# Patient Record
Sex: FEMALE | Race: OTHER
[De-identification: names, ages, dates, MRNs, and addresses within clinical notes are randomized per-mention and may not be internally consistent; named-entity substitution may affect disease eponyms.]

---

## 2017-11-17 ENCOUNTER — HOSPITAL ENCOUNTER (INPATIENT)
Dept: HOSPITAL 89 - ER | Age: 22
LOS: 8 days | Discharge: HOME | DRG: 872 | End: 2017-11-25
Attending: INTERNAL MEDICINE | Admitting: INTERNAL MEDICINE
Payer: MEDICAID

## 2017-11-17 VITALS — DIASTOLIC BLOOD PRESSURE: 46 MMHG | SYSTOLIC BLOOD PRESSURE: 91 MMHG

## 2017-11-17 VITALS
HEIGHT: 62 IN | WEIGHT: 202.44 LBS | WEIGHT: 202.44 LBS | BODY MASS INDEX: 37.26 KG/M2 | BODY MASS INDEX: 37.26 KG/M2 | HEIGHT: 62 IN

## 2017-11-17 VITALS — SYSTOLIC BLOOD PRESSURE: 78 MMHG | DIASTOLIC BLOOD PRESSURE: 46 MMHG

## 2017-11-17 VITALS — SYSTOLIC BLOOD PRESSURE: 81 MMHG | DIASTOLIC BLOOD PRESSURE: 45 MMHG

## 2017-11-17 VITALS — DIASTOLIC BLOOD PRESSURE: 50 MMHG | SYSTOLIC BLOOD PRESSURE: 98 MMHG

## 2017-11-17 VITALS — SYSTOLIC BLOOD PRESSURE: 84 MMHG | DIASTOLIC BLOOD PRESSURE: 44 MMHG

## 2017-11-17 VITALS — DIASTOLIC BLOOD PRESSURE: 45 MMHG | SYSTOLIC BLOOD PRESSURE: 80 MMHG

## 2017-11-17 VITALS — SYSTOLIC BLOOD PRESSURE: 91 MMHG | DIASTOLIC BLOOD PRESSURE: 43 MMHG

## 2017-11-17 VITALS — DIASTOLIC BLOOD PRESSURE: 43 MMHG | SYSTOLIC BLOOD PRESSURE: 80 MMHG

## 2017-11-17 VITALS — DIASTOLIC BLOOD PRESSURE: 47 MMHG | SYSTOLIC BLOOD PRESSURE: 94 MMHG

## 2017-11-17 VITALS — SYSTOLIC BLOOD PRESSURE: 96 MMHG | DIASTOLIC BLOOD PRESSURE: 45 MMHG

## 2017-11-17 DIAGNOSIS — J45.20: ICD-10-CM

## 2017-11-17 DIAGNOSIS — N13.6: ICD-10-CM

## 2017-11-17 DIAGNOSIS — E87.6: ICD-10-CM

## 2017-11-17 DIAGNOSIS — K21.9: ICD-10-CM

## 2017-11-17 DIAGNOSIS — F41.8: ICD-10-CM

## 2017-11-17 DIAGNOSIS — Z99.81: ICD-10-CM

## 2017-11-17 DIAGNOSIS — A41.51: Primary | ICD-10-CM

## 2017-11-17 DIAGNOSIS — Z90.49: ICD-10-CM

## 2017-11-17 DIAGNOSIS — M26.609: ICD-10-CM

## 2017-11-17 LAB — PLATELET COUNT, AUTOMATED: 127 K/UL (ref 150–450)

## 2017-11-17 PROCEDURE — 84155 ASSAY OF PROTEIN SERUM: CPT

## 2017-11-17 PROCEDURE — 87088 URINE BACTERIA CULTURE: CPT

## 2017-11-17 PROCEDURE — 84075 ASSAY ALKALINE PHOSPHATASE: CPT

## 2017-11-17 PROCEDURE — 82435 ASSAY OF BLOOD CHLORIDE: CPT

## 2017-11-17 PROCEDURE — 83735 ASSAY OF MAGNESIUM: CPT

## 2017-11-17 PROCEDURE — 87077 CULTURE AEROBIC IDENTIFY: CPT

## 2017-11-17 PROCEDURE — 83605 ASSAY OF LACTIC ACID: CPT

## 2017-11-17 PROCEDURE — 81001 URINALYSIS AUTO W/SCOPE: CPT

## 2017-11-17 PROCEDURE — 82247 BILIRUBIN TOTAL: CPT

## 2017-11-17 PROCEDURE — 76000 FLUOROSCOPY <1 HR PHYS/QHP: CPT

## 2017-11-17 PROCEDURE — 87880 STREP A ASSAY W/OPTIC: CPT

## 2017-11-17 PROCEDURE — 74170 CT ABD WO CNTRST FLWD CNTRST: CPT

## 2017-11-17 PROCEDURE — 82374 ASSAY BLOOD CARBON DIOXIDE: CPT

## 2017-11-17 PROCEDURE — 72194 CT PELVIS W/O & W/DYE: CPT

## 2017-11-17 PROCEDURE — 96361 HYDRATE IV INFUSION ADD-ON: CPT

## 2017-11-17 PROCEDURE — 87081 CULTURE SCREEN ONLY: CPT

## 2017-11-17 PROCEDURE — 36415 COLL VENOUS BLD VENIPUNCTURE: CPT

## 2017-11-17 PROCEDURE — 87186 SC STD MICRODIL/AGAR DIL: CPT

## 2017-11-17 PROCEDURE — 84295 ASSAY OF SERUM SODIUM: CPT

## 2017-11-17 PROCEDURE — 82947 ASSAY GLUCOSE BLOOD QUANT: CPT

## 2017-11-17 PROCEDURE — 82310 ASSAY OF CALCIUM: CPT

## 2017-11-17 PROCEDURE — 87502 INFLUENZA DNA AMP PROBE: CPT

## 2017-11-17 PROCEDURE — 97161 PT EVAL LOW COMPLEX 20 MIN: CPT

## 2017-11-17 PROCEDURE — 82565 ASSAY OF CREATININE: CPT

## 2017-11-17 PROCEDURE — 96365 THER/PROPH/DIAG IV INF INIT: CPT

## 2017-11-17 PROCEDURE — 82040 ASSAY OF SERUM ALBUMIN: CPT

## 2017-11-17 PROCEDURE — 85025 COMPLETE CBC W/AUTO DIFF WBC: CPT

## 2017-11-17 PROCEDURE — 84132 ASSAY OF SERUM POTASSIUM: CPT

## 2017-11-17 PROCEDURE — 96375 TX/PRO/DX INJ NEW DRUG ADDON: CPT

## 2017-11-17 PROCEDURE — 84520 ASSAY OF UREA NITROGEN: CPT

## 2017-11-17 PROCEDURE — 99285 EMERGENCY DEPT VISIT HI MDM: CPT

## 2017-11-17 PROCEDURE — 86140 C-REACTIVE PROTEIN: CPT

## 2017-11-17 PROCEDURE — 71010: CPT

## 2017-11-17 PROCEDURE — 84460 ALANINE AMINO (ALT) (SGPT): CPT

## 2017-11-17 PROCEDURE — 84450 TRANSFERASE (AST) (SGOT): CPT

## 2017-11-17 PROCEDURE — 85651 RBC SED RATE NONAUTOMATED: CPT

## 2017-11-17 PROCEDURE — 86308 HETEROPHILE ANTIBODY SCREEN: CPT

## 2017-11-17 PROCEDURE — 74177 CT ABD & PELVIS W/CONTRAST: CPT

## 2017-11-17 PROCEDURE — 96367 TX/PROPH/DG ADDL SEQ IV INF: CPT

## 2017-11-17 PROCEDURE — 93005 ELECTROCARDIOGRAM TRACING: CPT

## 2017-11-17 PROCEDURE — 87040 BLOOD CULTURE FOR BACTERIA: CPT

## 2017-11-17 RX ADMIN — THIAMINE HYDROCHLORIDE PRN MLS/HR: 100 INJECTION, SOLUTION INTRAMUSCULAR; INTRAVENOUS at 21:53

## 2017-11-17 NOTE — RADIOLOGY IMAGING REPORT
FACILITY: [facility] 

 

PATIENT NAME: Lissette Lindsey

: 1995

MR: [id number]

V: [id number]

EXAM DATE: 

ORDERING PHYSICIAN: DENISHA NG

TECHNOLOGIST: 

 

Location: [facility] [place]

Patient: Lissette Lindsey

: 1995

MRN: [id number]

Visit/Account:[id number]

Date of Sevice: 2017

 

ACCESSION #: [id number]

 

EXAMINATION: Portable AP Chest

 

HISTORY: Fever.

 

COMPARISON: 2016.

 

FINDINGS:

The lungs are clear.  No focal consolidation or significant pleural fluid.  No pneumothorax.  Normal 
cardiomediastinal silhouette, with normal heart size and pulmonary vascularity.  Visualized osseous s
tructures are unremarkable.

 

IMPRESSION:  Negative chest.

 

Report Dictated By: Adrian Wiseman MD at 2017 7:47 PM

 

Report E-Signed By: Adrian Wiseman MD  at 2017 7:48 PM

 

WSN:[id number]

## 2017-11-17 NOTE — EKG
FACILITY: [facility] 

 

PATIENT NAME: NINI SON

: [id number]

MR: [id number]

V: [id number]

EXAM DATE: 

ORDERING PHYSICIAN: DENISHA NG

TECHNOLOGIST: SIMONS

 

Test Reason : TACHY

Blood Pressure : ***/*** mmHG

Vent. Rate : 164 BPM     Atrial Rate : 164 BPM

   P-R Int : 104 ms          QRS Dur : 066 ms

    QT Int : 300 ms       P-R-T Axes : 000 034 053 degrees

   QTc Int : 495 ms

 

Sinus tachycardia with short SC

Cannot rule out Anterior infarct , age undetermined

Abnormal ECG

No previous ECGs available

Confirmed by ANTHONY MAJOR ([id number]) on 2017 6:13:09 AM

 

Referred By:  JAMA           Confirmed By:ANTHONY MAJOR

## 2017-11-17 NOTE — ER REPORT
History and Physical


Time Seen By MD:  18:13


Hx. of Stated Complaint:  


Patient seen earlier with headache.  Now reporting right sided abdominal and 


back pain.


HPI/ROS


CHIEF COMPLAINT: Fever, right-sided abdominal pain





HISTORY OF PRESENT ILLNESS: 22-year-old female patient presents to emergency 

room with complaint of fever, right-sided abdominal pain. Patient states this 

been going on just since she woke up from nap this afternoon. Patient states she

's been ill for the last 3 days. She states she was seen earlier in the 

emergency room today. She had a horrendous headache at that time. She states 

the headache is completely resolved. She's not having any headache or neck 

stiffness at this time. She states though that she's having lots of right-sided 

abdominal pain. She states she is not had any nausea or vomiting today, however 

she has had over the last couple days. She states she was diagnosed with a 

urinary tract infection and was started on antibiotics. She has not started 

taking those yet. She states she is significantly dizzy. She states that she 

has a pain that she rates an 8 out of 10.





REVIEW OF SYSTEMS:


Respiratory: No cough, no dyspnea.


Cardiovascular: No chest pain, no palpitations.


Gastrointestinal: As noted above


Musculoskeletal: No back pain.


Allergies:  


Coded Allergies:  


     No Known Allergies (Verified  Allergy, Mild, 16)


Home Meds


Active Scripts


Promethazine Hcl (PROMETHAZINE HCL) 25 Mg Tablet, 25 MG PO Q8H Y for NAUSEA/

VOMITING, #20 TAB 0 Refills


   Prov:ELISE YUN MD         17


Cyclobenzaprine Hcl (CYCLOBENZAPRINE HCL) 10 Mg Tablet, 10 MG PO Q8H Y for 

MUSCLE SPASMS, #20 TAB 0 Refills


   Prov:ELISE YUN MD         17


Ketorolac Tromethamine (KETOROLAC TROMETHAMINE) 10 Mg Tab, 10 MG PO Q6H Y for 

PAIN, #12 TAB 0 Refills


   Prov:ELISE YUN MD         17


Amoxicillin/Pot Clav 875-125 Mg Tab (AUGMENTIN 875-125 TABLET) 1 Each Tablet, 1 

TAB PO Q12H, #20 TAB 0 Refills


   Prov:ELISE YUN MD         17


Reported Medications


[implanon]   No Conflict Check, SUBQ


   3/5/16


Discontinued Reported Medications


Alprazolam (XANAX) 1 Mg Tablet, 1 TAB PO TID Y for ANXIETY, TAB


   3/5/16


Discontinued Scripts


Ibuprofen (IBUPROFEN) 600 Mg Tablet, 1 TAB PO Q6H for PAIN, #30


   Prov:KIMMY MOORE MD         16


Past Medical/Surgical History


Patient has a past medical history of migraines, oxygen at night, asthma, reflux

, cholecystitis, TMJ, depression, anxiety.


Patient has a surgical history of tonsillectomy, cholecystectomy.


Patient has a family medical history of cancer, CAD, diabetes, psychiatric 

problems.


Reviewed Nurses Notes:  Yes


Hx Smoking:  No


Smoking Status:  Never Smoker


Exposure to Second Hand Smoke?:  Yes


Hx Substance Use Disorder:  No


Hx Alcohol Use:  No


Constitutional





Vital Sign - Last 24 Hours








 17





 17:59 17:59 18:03 18:08


 


Temp  103.2  


 


Pulse  168 168 165


 


Resp  18  35


 


B/P (MAP) 130/80 (97) 130/80  


 


Pulse Ox  92 92 92


 


O2 Delivery  Room Air  


 


    





 17





 18:18 18:23 18:30 18:33


 


Pulse 166 171  169


 


Resp 13 12  22


 


B/P (MAP)   112/49 (70) 


 


Pulse Ox 91 95  90





 17





 18:38 18:43 18:48 18:53


 


Pulse 166 156 151 148


 


Resp 32 34 16 11


 


Pulse Ox 91 90 93 93





 17





 18:58 19:00 19:02 19:03


 


Temp   103.2 


 


Pulse 149  168 142


 


Resp 18  18 32


 


B/P (MAP)  95/55 (68) 130/80 (97) 


 


Pulse Ox 91  92 91


 


O2 Delivery   Room Air 


 


    





 17





 19:08 19:28 19:30 19:30


 


Temp   102.5 


 


Pulse  144  


 


Resp 35 18  


 


B/P (MAP)    102/51 (68)


 


Pulse Ox 91 91  


 


    





 17





 19:43 19:58 20:00 20:13


 


Pulse 133 133  133


 


Resp 10 19  


 


B/P (MAP)   101/52 (68) 


 


Pulse Ox 92 88  95














Intake and Output   


 


 17





 15:00 23:00 07:00


 


Intake Total  2100 ml 


 


Balance  2100 ml 








Physical Exam


  General Appearance: The patient is alert, has no immediate need for airway 

protection and no current signs of toxicity.


ENT: Tympanic membranes are erythematous, bulging, auditory canals are patent, 

mucous membranes are moist.


Respiratory: Chest is non tender, lungs are clear to auscultation.


Cardiac: regular rhythm. Patient is tachycardic with a ventricular rate was 164 

bpm


Gastrointestinal: Abdomen is soft and tender in the right lower quadrant, no 

masses, bowel sounds normal. Patient had a right-sided CVA tenderness.


Musculoskeletal:  Neck: Neck is supple and non tender.


   Extremities have full range of motion and are non tender.


Skin: No rashes or lesions.





DIFFERENTIAL DIAGNOSIS: After history and physical exam differential diagnosis 

was considered for fever in adults including but not limited to pneumonia, 

urinary tract infection, viral syndrome, and influenza. Included in the 

differential is pyelonephritis, appendicitis





Medical Decision Making


Data Points


Result Diagram:  


17 1815                                                                  

              17 1815





Laboratory





Hematology








Test


  17


18:15 17


18:29


 


Red Blood Count


  4.96 M/uL


(4.17-5.56) 


 


 


Mean Corpuscular Volume


  87.5 fL


(80.0-96.0) 


 


 


Mean Corpuscular Hemoglobin


  30.9 pg


(26.0-33.0) 


 


 


Mean Corpuscular Hemoglobin


Concent 35.3 g/dL


(32.0-36.0) 


 


 


Red Cell Distribution Width


  12.7 %


(11.5-14.5) 


 


 


Mean Platelet Volume


  10.3 fL


(7.2-11.1) 


 


 


Neutrophils (%) (Auto)


  78.0 %


(39.4-72.5) 


 


 


Lymphocytes (%) (Auto)


  20.6 %


(17.6-49.6) 


 


 


Monocytes (%) (Auto)


  0.7 %


(4.1-12.4) 


 


 


Eosinophils (%) (Auto)


  0.2 %


(0.4-6.7) 


 


 


Basophils (%) (Auto)


  0.5 %


(0.3-1.4) 


 


 


Nucleated RBC Relative Count


(auto) 0.1 /100WBC 


  


 


 


Neutrophils # (Auto)


  3.0 K/uL


(2.0-7.4) 


 


 


Lymphocytes # (Auto)


  0.8 K/uL


(1.3-3.6) 


 


 


Monocytes # (Auto)


  0.0 K/uL


(0.3-1.0) 


 


 


Eosinophils # (Auto)


  0.0 K/uL


(0.0-0.5) 


 


 


Basophils # (Auto)


  0.0 K/uL


(0.0-0.1) 


 


 


Nucleated RBC Absolute Count


(auto) 0.00 K/uL 


  


 


 


Erythrocyte Sedimentation Rate


  43 mm/HOUR


(0-20) 


 


 


Sodium Level


  135 mmol/L


(137-145) 


 


 


Potassium Level


  3.7 mmol/L


(3.5-5.0) 


 


 


Chloride Level


  100 mmol/L


() 


 


 


Carbon Dioxide Level


  19 mmol/L


(22-31) 


 


 


Blood Urea Nitrogen


  13 mg/dl


(7-18) 


 


 


Creatinine


  1.00 mg/dl


(0.52-1.04) 


 


 


Glomerular Filtration Rate


Calc > 60.0 


  


 


 


Random Glucose


  91 mg/dl


() 


 


 


Lactate


  4.8 mmol/L


(0.7-2.1) 


 


 


Calcium Level


  9.1 mg/dl


(8.4-10.2) 


 


 


Total Bilirubin


  2.3 mg/dl


(0.2-1.3) 


 


 


Aspartate Amino Transf


(AST/SGOT) 28 U/L (0-35) 


  


 


 


Alanine Aminotransferase


(ALT/SGPT) 57 U/L (0-56) 


  


 


 


Alkaline Phosphatase


  142 U/L


(0-126) 


 


 


C-Reactive Protein


  21.9 mg/dl


(<1.0) 


 


 


Total Protein


  7.1 gm/dl


(6.3-8.2) 


 


 


Albumin


  3.9 g/dl


(3.5-5.0) 


 


 


Monoscreen


  Negative


(NEGATIVE) 


 


 


Influenza Type A Antigen


  Negative


(NEGATIVE) 


 


 


Influenza Type B Antigen


  Negative


(NEGATIVE) 


 


 


Group A Streptococcus Screen


  Negative


(NEGATIVE) 


 


 


Urine Color  Caitlin 


 


Urine Clarity  Turbid 


 


Urine pH


  


  5.0 pH


(4.8-9.5)


 


Urine Specific Gravity  1.016 


 


Urine Protein


  


  100 mg/dL


(NEGATIVE)


 


Urine Glucose (UA)


  


  Negative mg/dL


(NEGATIVE)


 


Urine Ketones


  


  Negative mg/dL


(NEGATIVE)


 


Urine Blood


  


  Large


(NEGATIVE)


 


Urine Nitrite


  


  Negative


(NEGATIVE)


 


Urine Bilirubin


  


  Negative


(NEGATIVE)


 


Urine Urobilinogen


  


  4.0 mg/dL


(0.2-1.9)


 


Urine Leukocyte Esterase


  


  Large


(NEGATIVE)


 


Urine RBC


  


  42 /HPF


(0-2/HPF)


 


Urine WBC


  


  1016 /HPF


(0-5/HPF)


 


Urine WBC Clumps  Many /HPF 


 


Urine Squamous Epithelial


Cells 


  Many /LPF


(</=FEW)


 


Urine Transitional Epithelial


Cells 


  Many /LPF


(NONE-FEW)


 


Urine Bacteria


  


  Moderate /HPF


(NONE-FEW)


 


Urine Granular Casts


  


  Many /LPF


(NONE)


 


Urine Mucus


  


  Few /HPF


(NONE-FEW)








Chemistry








Test


  17


18:15 17


18:29


 


White Blood Count


  3.8 k/uL


(4.5-11.0) 


 


 


Red Blood Count


  4.96 M/uL


(4.17-5.56) 


 


 


Hemoglobin


  15.3 g/dL


(12.0-16.0) 


 


 


Hematocrit


  43.4 %


(34.0-47.0) 


 


 


Mean Corpuscular Volume


  87.5 fL


(80.0-96.0) 


 


 


Mean Corpuscular Hemoglobin


  30.9 pg


(26.0-33.0) 


 


 


Mean Corpuscular Hemoglobin


Concent 35.3 g/dL


(32.0-36.0) 


 


 


Red Cell Distribution Width


  12.7 %


(11.5-14.5) 


 


 


Platelet Count


  127 K/uL


(150-450) 


 


 


Mean Platelet Volume


  10.3 fL


(7.2-11.1) 


 


 


Neutrophils (%) (Auto)


  78.0 %


(39.4-72.5) 


 


 


Lymphocytes (%) (Auto)


  20.6 %


(17.6-49.6) 


 


 


Monocytes (%) (Auto)


  0.7 %


(4.1-12.4) 


 


 


Eosinophils (%) (Auto)


  0.2 %


(0.4-6.7) 


 


 


Basophils (%) (Auto)


  0.5 %


(0.3-1.4) 


 


 


Nucleated RBC Relative Count


(auto) 0.1 /100WBC 


  


 


 


Neutrophils # (Auto)


  3.0 K/uL


(2.0-7.4) 


 


 


Lymphocytes # (Auto)


  0.8 K/uL


(1.3-3.6) 


 


 


Monocytes # (Auto)


  0.0 K/uL


(0.3-1.0) 


 


 


Eosinophils # (Auto)


  0.0 K/uL


(0.0-0.5) 


 


 


Basophils # (Auto)


  0.0 K/uL


(0.0-0.1) 


 


 


Nucleated RBC Absolute Count


(auto) 0.00 K/uL 


  


 


 


Erythrocyte Sedimentation Rate


  43 mm/HOUR


(0-20) 


 


 


Glomerular Filtration Rate


Calc > 60.0 


  


 


 


Lactate


  4.8 mmol/L


(0.7-2.1) 


 


 


Calcium Level


  9.1 mg/dl


(8.4-10.2) 


 


 


Total Bilirubin


  2.3 mg/dl


(0.2-1.3) 


 


 


Aspartate Amino Transf


(AST/SGOT) 28 U/L (0-35) 


  


 


 


Alanine Aminotransferase


(ALT/SGPT) 57 U/L (0-56) 


  


 


 


Alkaline Phosphatase


  142 U/L


(0-126) 


 


 


C-Reactive Protein


  21.9 mg/dl


(<1.0) 


 


 


Total Protein


  7.1 gm/dl


(6.3-8.2) 


 


 


Albumin


  3.9 g/dl


(3.5-5.0) 


 


 


Monoscreen


  Negative


(NEGATIVE) 


 


 


Influenza Type A Antigen


  Negative


(NEGATIVE) 


 


 


Influenza Type B Antigen


  Negative


(NEGATIVE) 


 


 


Group A Streptococcus Screen


  Negative


(NEGATIVE) 


 


 


Urine Color  Caitlin 


 


Urine Clarity  Turbid 


 


Urine pH


  


  5.0 pH


(4.8-9.5)


 


Urine Specific Gravity  1.016 


 


Urine Protein


  


  100 mg/dL


(NEGATIVE)


 


Urine Glucose (UA)


  


  Negative mg/dL


(NEGATIVE)


 


Urine Ketones


  


  Negative mg/dL


(NEGATIVE)


 


Urine Blood


  


  Large


(NEGATIVE)


 


Urine Nitrite


  


  Negative


(NEGATIVE)


 


Urine Bilirubin


  


  Negative


(NEGATIVE)


 


Urine Urobilinogen


  


  4.0 mg/dL


(0.2-1.9)


 


Urine Leukocyte Esterase


  


  Large


(NEGATIVE)


 


Urine RBC


  


  42 /HPF


(0-2/HPF)


 


Urine WBC


  


  1016 /HPF


(0-5/HPF)


 


Urine WBC Clumps  Many /HPF 


 


Urine Squamous Epithelial


Cells 


  Many /LPF


(</=FEW)


 


Urine Transitional Epithelial


Cells 


  Many /LPF


(NONE-FEW)


 


Urine Bacteria


  


  Moderate /HPF


(NONE-FEW)


 


Urine Granular Casts


  


  Many /LPF


(NONE)


 


Urine Mucus


  


  Few /HPF


(NONE-FEW)








Urinalysis








Test


  17


18:29


 


Urine Color Caitlin 


 


Urine Clarity Turbid 


 


Urine pH


  5.0 pH


(4.8-9.5)


 


Urine Specific Gravity 1.016 


 


Urine Protein


  100 mg/dL


(NEGATIVE)


 


Urine Glucose (UA)


  Negative mg/dL


(NEGATIVE)


 


Urine Ketones


  Negative mg/dL


(NEGATIVE)


 


Urine Blood


  Large


(NEGATIVE)


 


Urine Nitrite


  Negative


(NEGATIVE)


 


Urine Bilirubin


  Negative


(NEGATIVE)


 


Urine Urobilinogen


  4.0 mg/dL


(0.2-1.9)


 


Urine Leukocyte Esterase


  Large


(NEGATIVE)


 


Urine RBC


  42 /HPF


(0-2/HPF)


 


Urine WBC


  1016 /HPF


(0-5/HPF)


 


Urine WBC Clumps Many /HPF 


 


Urine Squamous Epithelial


Cells Many /LPF


(</=FEW)


 


Urine Transitional Epithelial


Cells Many /LPF


(NONE-FEW)


 


Urine Bacteria


  Moderate /HPF


(NONE-FEW)


 


Urine Granular Casts


  Many /LPF


(NONE)


 


Urine Mucus


  Few /HPF


(NONE-FEW)











EKG/Imaging


EKG Interpretation


12 lead EKG:


      Rhythm: Sinus tachycardia with ventricular rate of 164 bpm


      Axis: normal 


      QRS: normal


      ST segments: Nonspecific ST abnormality


Imaging


FACILITY: [facility] 


 


PATIENT NAME: Lissette Lindsey


: 1995


MR: [id number]


V: [id number]


EXAM DATE: [id number]


ORDERING PHYSICIAN: DENISHA NG


TECHNOLOGIST: 


 


Location: [facility]


Patient: Lissette Lindsey


: 1995


MRN: [id number]


Visit/Account:[id number]


Date of Sevice: 2017


 


ACCESSION #: [id number]


 


CT abdomen and pelvis with IV contrast


 


Indication: Abdominal pain, right lower quadrant


 


Comparison:   Prior examination from 2016 is not available at this 

time..


 


Technique:   Axial CT images were obtained through the abdomen and pelvis 

during injection of nonionic iodinated intravenous contrast. Reformatted 

coronal and sagittal images were also obtained. One of the following dose 

optimization techniques was utilized in the performance of this exam: Automated 

exposure control; adjustment of the mA and/or kV according to the patient's size

; or use of an iterative  reconstruction technique.  Specific details can be 

referenced in the facility's radiology CT exam operational policy.


Contrast:   75 ml of Isovue-370  IV contrast.


 


Findings:


Lower lung fields: Minimal hypoventilatory changes are seen in the bases.


 


Liver: No focal parenchymal abnormality of the liver.


Biliary: Gallbladder has been removed. Intra and extra hepatic biliary system 

is within normal limits.


Pancreas: Normal appearance.


Spleen: Normal appearance.


Adrenal glands: Unremarkable.


Kidneys / retroperitoneum: Left kidney is unremarkable. There is a heterogeneous

, striated appearance of the right kidney with moderate asymmetric perinephric 

stranding along the right perirenal space. There is urothelial enhancement seen 

involving the right ureter. There is a linear, 4 mm stone is seen at the right 

UPJ. Significant right-sided hydronephrosis.


 


 


Bowel / peritoneum / mesenteries: Bowel is without acute pathology. Appendix 

appears normal.


 


 


Lymph node assessment: No pathologic adenopathy identified. No acute pathology 

within the pelvis.


 


 


Vessels: No significant atherosclerotic calcifications seen throughout a 

nonaneurysmal abdominal aorta and branches.


 


Musculoskeletal / Body wall: Containing umbilical hernia.


 


 


 


IMPRESSION:


1. There is a striated appearance of the right kidney with moderate perinephric 

stranding. That in combination with the urothelial enhancement of proximal 

ureter is highly suspicious for right-sided pyelonephritis. No focal intrarenal 

abscess.


2. There is a 4 mm stone at the right ureteropelvic junction without 

significant hydronephrosis.


 


 


Report Dictated By: Julian Garcia MD at 2017 7:47 PM


 


Report E-Signed By: Julian Garcia MD  at 2017 7:56 PM





FACILITY: [facility] 


 


PATIENT NAME: Lissette Lindsey


: 1995


MR: [id number]


V: [id number]


EXAM DATE: [id number]


ORDERING PHYSICIAN: DENISHA NG


TECHNOLOGIST: 


 


Location: [facility]


Patient: Lissette Lindsey


: 1995


MRN: [id number]


Visit/Account:[id number]


Date of Sevice: 2017


 


ACCESSION #: [id number]


 


EXAMINATION: Portable AP Chest


 


HISTORY: Fever.


 


COMPARISON: 2016.


 


FINDINGS:


The lungs are clear.  No focal consolidation or significant pleural fluid.  No 

pneumothorax.  Normal cardiomediastinal silhouette, with normal heart size and 

pulmonary vascularity.  Visualized osseous structures are unremarkable.


 


IMPRESSION:  Negative chest.


 


Report Dictated By: Adrian Wiseman MD at 2017 7:47 PM


 


Report E-Signed By: Adrian Wiseman MD  at 2017 7:48 PM





ED Course/Re-evaluation


ED Course


Patient was admitted and examined, history and physical were obtained. 

Differential diagnoses were considered. On examination patient has significant 

tenderness to the right lower quadrant and right upper quadrant. A CBC, CMP, 

urinalysis, lactate, chest x-ray, CT scan of abdomen and pelvis were done. 

Patient had a white count of 3.8. Earlier today she had a white count 26. 

Lactate was elevated at 4.8, ESR was 42. Chest x-ray was negative, urinalysis 

showed 1000 white cells per high-power field in the urine. CT scan showed a 

right pyelonephritis. I discussed the case with Dr. Jen Hannah. Patient 

received 100 mg of Solu-Cortef here in the emergency room. Patient also 

received a dose of Zosyn, 4.5. Patient was admitted to Dr. Jen Hannah with a 

diagnosis of pyelonephritis and sepsis. Patient was admitted to the intensive 

care unit. I discussed this with the patient and her family and they verbalized 

understanding and agreement.


Decision to Disposition Date:  2017


Decision to Disposition Time:  20:38





Depart


Departure


Latest Vital Signs





Vital Signs








  Date Time  Temp Pulse Resp B/P (MAP) Pulse Ox O2 Delivery O2 Flow Rate FiO2


 


17 20:13  133   95   


 


17 20:00    101/52 (68)    


 


17 19:58   19     


 


17 19:30 102.5       


 


17 19:02      Room Air  








Impression:  


 Primary Impression:  


 Pyelonephritis


 Additional Impression:  


 Sepsis


Condition:  Improved


Disposition:  Admitted from ER


Referrals:  


RODERICK PABON (PCP)





Problem Qualifiers








 Additional Impression:  


 Sepsis


 Sepsis type:  sepsis due to unspecified organism  Qualified Codes:  A41.9 - 

Sepsis, unspecified organism








DENISHA NG 2017 18:13

## 2017-11-17 NOTE — RADIOLOGY IMAGING REPORT
FACILITY: [facility] 

 

PATIENT NAME: Lissette Lindsey

: 1995

MR: [id number]

V: [id number]

EXAM DATE: 

ORDERING PHYSICIAN: DENISHA NG

TECHNOLOGIST: 

 

Location: [facility]

Patient: Lissette Lindsey

: 1995

MRN: [id number]

Visit/Account:[id number]

Date of Sevice: 2017

 

ACCESSION #: [id number]

 

CT abdomen and pelvis with IV contrast

 

Indication: Abdominal pain, right lower quadrant

 

Comparison:   Prior examination from 2016 is not available at this time..

 

Technique:   Axial CT images were obtained through the abdomen and pelvis during injection of nonioni
c iodinated intravenous contrast. Reformatted coronal and sagittal images were also obtained. One of 
the following dose optimization techniques was utilized in the performance of this exam: Automated ex
posure control; adjustment of the mA and/or kV according to the patient's size; or use of an iterativ
e  reconstruction technique.  Specific details can be referenced in the facility's radiology CT exam 
operational policy.

Contrast:   75 ml of Isovue-370  IV contrast.

 

Findings:

Lower lung fields: Minimal hypoventilatory changes are seen in the bases.

 

Liver: No focal parenchymal abnormality of the liver.

Biliary: Gallbladder has been removed. Intra and extra hepatic biliary system is within normal limits
.

Pancreas: Normal appearance.

Spleen: Normal appearance.

Adrenal glands: Unremarkable.

Kidneys / retroperitoneum: Left kidney is unremarkable. There is a heterogeneous, striated appearance
 of the right kidney with moderate asymmetric perinephric stranding along the right perirenal space. 
There is urothelial enhancement seen involving the right ureter. There is a linear, 4 mm stone is see
n at the right UPJ. Significant right-sided hydronephrosis.

 

 

Bowel / peritoneum / mesenteries: Bowel is without acute pathology. Appendix appears normal.

 

 

Lymph node assessment: No pathologic adenopathy identified. No acute pathology within the pelvis.

 

 

Vessels: No significant atherosclerotic calcifications seen throughout a nonaneurysmal abdominal aort
a and branches.

 

Musculoskeletal / Body wall: Containing umbilical hernia.

 

 

 

IMPRESSION:

1. There is a striated appearance of the right kidney with moderate perinephric stranding. That in co
mbination with the urothelial enhancement of proximal ureter is highly suspicious for right-sided hira
lonephritis. No focal intrarenal abscess.

2. There is a 4 mm stone at the right ureteropelvic junction without significant hydronephrosis.

 

 

Report Dictated By: Julian Garcia MD at 2017 7:47 PM

 

Report E-Signed By: Julian Garcia MD  at 2017 7:56 PM

 

WSN:[id number]

## 2017-11-17 NOTE — HISTORY & PHYSICAL
History of Present Illness


Chief Complaint


Abdominal pain.


History of Present Illness


The patient is a 22 year old female with PMH significant for asthma and 

migraine headaches who presents with abdominal pain. The patient states she has 

been ill for about 3 days. She has had intermittent abdominal pain and has felt 

feverish. She has also had some nausea. She has not checked her temperature. 

She denies burning with urination. She was seen in the ER this am for severe 

headache and was also found to have a UTI at that time. She was given an RX for 

an antibiotic, but had not gotten it filled yet. She was treated for a migraine 

HA in the ER and went home and napped. When she woke up from her nap she felt 

dizzy and her abdominal pain was 8/10. She returned to the ER for evaluation.





In the ER, the patient was found to be tachycardic with heart rate in the 160s. 

Her lactate was elevated at 4.8. UA showed significant pyuria. CT scan showed a 

right pyelonephritis as well as a 4mm nonobstructing kidney stone at the right 

ureteropelvic junction She was started on IV fluids per the sepsis protocol and 

given a dose of Zosyn 4.5g IV. Solucortef 100mg was given. Blood cultures were 

drawn. Urine culture was done.





History


Problems:  


(1) Hx of cholecystectomy


Status:  Resolved


(2) History of tonsillectomy


Status:  Resolved


(3) Anxiety, generalized


Status:  Chronic


(4) Migraine headache


Status:  Chronic


(5) Asthma


Status:  Chronic


(6) Depression


Status:  Chronic


Home Meds


Reported Medications


Albuterol Sulfate 90 Mcg/Act (PROAIR HFA 90 MCG/ACT) 8.5 Gm Hfa.aer.ad, 2 PUFF 

IH Q4-6H, INHALER


   17


[implanon]   No Conflict Check, SUBQ


   3/5/16


Discontinued Reported Medications


Alprazolam (XANAX) 1 Mg Tablet, 1 TAB PO TID Y for ANXIETY, TAB


   3/5/16


Discontinued Scripts


Promethazine Hcl (PROMETHAZINE HCL) 25 Mg Tablet, 25 MG PO Q8H Y for NAUSEA/

VOMITING, #20 TAB 0 Refills


   Prov:ELISE YUN MD         17


Cyclobenzaprine Hcl (CYCLOBENZAPRINE HCL) 10 Mg Tablet, 10 MG PO Q8H Y for 

MUSCLE SPASMS, #20 TAB 0 Refills


   Prov:ELISE YUN MD         17


Ketorolac Tromethamine (KETOROLAC TROMETHAMINE) 10 Mg Tab, 10 MG PO Q6H Y for 

PAIN, #12 TAB 0 Refills


   Prov:ELISE YUN MD         17


Amoxicillin/Pot Clav 875-125 Mg Tab (AUGMENTIN 875-125 TABLET) 1 Each Tablet, 1 

TAB PO Q12H, #20 TAB 0 Refills


   Prov:ELISE YUN MD         17


Ibuprofen (IBUPROFEN) 600 Mg Tablet, 1 TAB PO Q6H for PAIN, #30


   Prov:KIMMY MOORE MD         16


Allergies:  


Coded Allergies:  


     No Known Allergies (Verified  Allergy, Mild, 16)


Patient History:  


FH: CHF (congestive heart failure)


  Grandmother


FH: diabetes mellitus


  Grandmother


FH: hypertension


  Grandmother


FH: migraine headache


  MOTHER


Psychiatric hospitalization


  Grandmother


Other Social/Family Hx


The patient is single.


Hx Smoking:  No


Smoking Status:  Never Smoker


Exposure to Second Hand Smoke?:  Yes


Caffeine Intake:  Soda


Caffeine/Cups Per Day:  2-3/DAY


Hx Alcohol Use:  No


Hx Substance Use Disorder:  No


Social Drug Use:  Never


Social Drugs:  Marijuana





Review of Systems


All Systems Reviewed/Normal:  Yes, Except as Noted


Constitutional:  Fever


Neurological:  Other (Migraines.)


Eyes:  No Vision Change


ENT:  No Hearing Loss


Cardiovascular:  No Chest Pain


Respiratory:  No Shortness of Breath


Gastrointestinal:  Nausea, Abdominal Pain


Genitourinary:  Other (Flank pain.)


Psychiatric:  Depression, Anxiety (Hx of depression with suicidal ideation and 

anxiety.)





Exam


Vital Signs





Vital Signs








  Date Time  Temp Pulse Resp B/P (MAP) Pulse Ox O2 Delivery O2 Flow Rate FiO2


 


17 22:32     95 Nasal Cannula 2.0 


 


17 22:30 98.5 125 21 91/46 (61)    








General Appearance:  Other (Sleepy after pain meds given.)


Neuro:  No Gross deficits


Eyes:  PERRLA


Cardiovascular:  Other (Tachy, regular.)


Respiratory:  Clear to Auscultation


GI:  Other (Soft, tender on right side.)


Extremities:  Warm, Perfused


Integumentary:  Skin Intact without Lesion / Mass


Psych:  Appropriate Mood & Affect





Medical Decision Making


Data Points


Result Diagram:  


17














Item Value  Date Time


 


Total Bilirubin 2.3 mg/dl H 17 1815


 


Aspartate Amino Transf (AST/SGOT) 28 U/L 17 1815


 


Alanine Aminotransferase (ALT/SGPT) 57 U/L H 17 1815


 


Alkaline Phosphatase 142 U/L H 17 1815


 


C-Reactive Protein 21.9 mg/dl H 17 1815


 


Total Protein 7.1 gm/dl 17 1815


 


Albumin 3.9 g/dl 17 181


 


Calcium Level 9.1 mg/dl 17 1815


 


Lactate 4.8 mmol/L *H 17 181


 


Lactate 1.9 mmol/L 17 2153


 


Monoscreen Negative 17 181


 


Influenza Type A Antigen Negative 17


 


Influenza Type B Antigen Negative 17 181


 


Group A Streptococcus Screen Negative 17 181


 


Urine Color Caitlin 17 1829


 


Urine Clarity Turbid 17 1829


 


Urine pH 5.0 pH 17 1829


 


Urine Specific Gravity 1.016 17 1829


 


Urine Protein 100 mg/dL 17 1829


 


Urine Glucose (UA) Negative mg/dL 17 1829


 


Urine Ketones Negative mg/dL 17 1829


 


Urine Blood Large 17 1829


 


Urine Nitrite Negative 17 1829


 


Urine Bilirubin Negative 17 1829


 


Urine Urobilinogen 4.0 mg/dL H 17 1829


 


Urine Leukocyte Esterase Large H 17 1829


 


Urine RBC 42 /HPF 17 1829


 


Urine WBC 1016 /HPF 17 1829


 


Urine WBC Clumps Many /HPF 17 1829


 


Urine Squamous Epithelial Cells Many /LPF H 17 1829


 


Urine Transitional Epithelial Cells Many /LPF H 17 1829


 


Urine Bacteria Moderate /HPF H 17 1829


 


Urine Granular Casts Many /LPF H 17 1829


 


Urine Mucus Few /HPF 17 1829


 


Urine HCG, Qualitative Negative 17 0818











                               [facility] *LIVE*                     

             


                              [address] [place] [address]                 

     


                    TITO CASTRO M.D., DIRECTOR OF LABORATORY SERVICES      

     


                              HAZEL MAYNARD M.D., PATHOLOGIST





RUN DATE: 17                 Specimen Inquiry Report                     

    PAGE 1   


RUN TIME: 2300





--------------------------------------------------------------------------------

------------





PATIENT: LISSETTE LINDSEY                ACCT: [id number] LOC:  [facility]           U

: [id number]


                                       AGE/SX: 22/         ROOM:            REG

: 17


REG DR:  ELISE YUN MD            :    1995   BED:             DIS

:         


                                       STATUS: MANUEL VAUGHAN       TLOC:           


--------------------------------------------------------------------------------

------------








SPEC #: [id number]       ZAKIA:      STATUS:  RES            REQ 

#: [id number]


                            RECD:      [facility] DR: ELISE YUN MD

            


SOURCE: BLOOD               ENTR:      [facility] DR: RODERICK PABON [facility]:                                                                        

            


ORDERED:  BCGS, CULT BLOOD                                                     

   


--------------------------------------------------------------------------------

------------





  Procedure                         Result                         Verified    

           


--------------------------------------------------------------------------------

------------





  BLOOD CULTURE GRAM STAIN  Final                                  


                                    ANAEROBIC BOTTLE POSITIVE


                                    MOD GRAM NEGATIVE RODS


                                    POSITIVE BLOOD CULTURE GRAM STAIN REPORT 

CALLED TO:


                                    MANUEL ZALDIVAR


                                    DATE/TIME REPORT CALLED: 17 @2300


                                    CALLED BY: ERIKA





  BLOOD CULTURE  Preliminary                                       17-





        Positive BLOOD CULTURE workup in progress. Report to follow.





--------------------------------------------------------------------------------

------------





EKG / Imaging


Monitor Interpretation:  Sinus Tachycardia


Imaging


FACILITY: [facility] 


 


PATIENT NAME: Lissette Lindsey


: 1995


MR: [id number]


V: [id number]


EXAM DATE: 


ORDERING PHYSICIAN: DENISHA NG


TECHNOLOGIST: 


 


Location: [facility]


Patient: Lissette Lindsey


: 1995


MRN: [id number]


Visit/Account:[id number]


Date of Sevice: 2017


 


ACCESSION #: [id number]


 


EXAMINATION: Portable AP Chest


 


HISTORY: Fever.


 


COMPARISON: 2016.


 


FINDINGS:


The lungs are clear.  No focal consolidation or significant pleural fluid.  No 

pneumothorax.  Normal cardiomediastinal silhouette, with normal heart size and 

pulmonary vascularity.  Visualized osseous structures are unremarkable.


 


IMPRESSION:  Negative chest.


 


Report Dictated By: Adrian Wiseman MD at 2017 7:47 PM


 


Report E-Signed By: Adrian Wiseman MD  at 2017 7:48 PM


 


WSN:[id number]








FACILITY: [facility] 


 


PATIENT NAME: Lissette Lindsey


: 1995


MR: [id number]


V: [id number]


EXAM DATE: [id number]


ORDERING PHYSICIAN: DENISHA NG


TECHNOLOGIST: 


 


Location: [facility]


Patient: Lissette Lindsey


: 1995


MRN: [id number]


Visit/Account:[id number]


Date of Sevice: 2017


 


ACCESSION #: [id number]


 


CT abdomen and pelvis with IV contrast


 


Indication: Abdominal pain, right lower quadrant


 


Comparison:   Prior examination from 2016 is not available at this 

time..


 


Technique:   Axial CT images were obtained through the abdomen and pelvis 

during injection of nonionic iodinated intravenous contrast. Reformatted 

coronal and sagittal images were also obtained. One of the following dose 

optimization techniques was utilized in the performance of this exam: Automated 

exposure control; adjustment of the mA and/or kV according to the patient's size

; or use of an iterative  reconstruction technique.  Specific details can be 

referenced in the facility's radiology CT exam operational policy.


Contrast:   75 ml of Isovue-370  IV contrast.


 


Findings:


Lower lung fields: Minimal hypoventilatory changes are seen in the bases.


 


Liver: No focal parenchymal abnormality of the liver.


Biliary: Gallbladder has been removed. Intra and extra hepatic biliary system 

is within normal limits.


Pancreas: Normal appearance.


Spleen: Normal appearance.


Adrenal glands: Unremarkable.


Kidneys / retroperitoneum: Left kidney is unremarkable. There is a heterogeneous

, striated appearance of the right kidney with moderate asymmetric perinephric 

stranding along the right perirenal space. There is urothelial enhancement seen 

involving the right ureter. There is a linear, 4 mm stone is seen at the right 

UPJ. Significant right-sided hydronephrosis.


 


 


Bowel / peritoneum / mesenteries: Bowel is without acute pathology. Appendix 

appears normal.


 


 


Lymph node assessment: No pathologic adenopathy identified. No acute pathology 

within the pelvis.


 


 


Vessels: No significant atherosclerotic calcifications seen throughout a 

nonaneurysmal abdominal aorta and branches.


 


Musculoskeletal / Body wall: Containing umbilical hernia.


 


 


 


IMPRESSION:


1. There is a striated appearance of the right kidney with moderate perinephric 

stranding. That in combination with the urothelial enhancement of proximal 

ureter is highly suspicious for right-sided pyelonephritis. No focal intrarenal 

abscess.


2. There is a 4 mm stone at the right ureteropelvic junction without 

significant hydronephrosis.


 


 


Report Dictated By: Julian Garcia MD at 2017 7:47 PM


 


Report E-Signed By: Julian Garcia MD  at 2017 7:56 PM


 


WSN:[id number]





Pre-Admit Course


Medical Record Review:  Yes





Assessment and Plan


Problems:  


(1) Sepsis


Status:  Acute


Assessment & Plan:  The patient presented with significant tachycardia and 

elevated lactate. She was treated with IV fluids, IV Zosyn and Solu-Cortef in 

ER. Her blood pressure did drop despite receiving large volume IV fluids. Her 

heart rate improved. She was admitted to the ICU. Repeat lactate after fluids 

normalized (1.9). She will be aggressively hydrated in ICU and if a MAP of 65 

can not be maintained, will consider vasopressors. Will continue Zosyn at 4.5g 

IV q 6 hours. Continue Solu-Cortef. Blood cultures are already showing a gram 

negative rubi. Repeat labs in am.





(2) Pyelonephritis


Status:  Acute


Assessment & Plan:  See above. CT of abdomen/pelvis shows a right sided 

pyelonephritis.





(3) Nephrolithiasis


Status:  Acute


Assessment & Plan:  CT shows a nonobstructing kidney stone at the ureteropelvic 

junction on the right. 





(4) Asthma


Status:  Chronic


Assessment & Plan:  Continue albuterol prn.





Time Spent on Plan of Care:  < 30 min





Venous Thromboembolism


VTE Risk


Physician Assess for VTE Risk:  Yes


Patient's VTE Risk:  Low





VTE Diagnostic Test


2 Days Prior to Admit:  No





Antithrombotics


Is Pt On Any Antithrombotics?:  Yes





Exam


Sepsis Risk:  Severe Sepsis Risk





Problem Qualifiers





(1) Sepsis:  


Sepsis type:  sepsis due to unspecified organism  Qualified Codes:  A41.9 - 

Sepsis, unspecified organism


(2) Asthma:  


Asthma severity:  mild  Asthma persistence:  intermittent








ANTHONY VASQUEZ MD 2017 23:56

## 2017-11-18 VITALS — DIASTOLIC BLOOD PRESSURE: 68 MMHG | SYSTOLIC BLOOD PRESSURE: 98 MMHG

## 2017-11-18 VITALS — DIASTOLIC BLOOD PRESSURE: 40 MMHG | SYSTOLIC BLOOD PRESSURE: 82 MMHG

## 2017-11-18 VITALS — SYSTOLIC BLOOD PRESSURE: 83 MMHG | DIASTOLIC BLOOD PRESSURE: 56 MMHG

## 2017-11-18 VITALS — DIASTOLIC BLOOD PRESSURE: 69 MMHG | SYSTOLIC BLOOD PRESSURE: 100 MMHG

## 2017-11-18 VITALS — DIASTOLIC BLOOD PRESSURE: 56 MMHG | SYSTOLIC BLOOD PRESSURE: 80 MMHG

## 2017-11-18 VITALS — DIASTOLIC BLOOD PRESSURE: 67 MMHG | SYSTOLIC BLOOD PRESSURE: 102 MMHG

## 2017-11-18 VITALS — SYSTOLIC BLOOD PRESSURE: 99 MMHG | DIASTOLIC BLOOD PRESSURE: 67 MMHG

## 2017-11-18 VITALS — SYSTOLIC BLOOD PRESSURE: 98 MMHG | DIASTOLIC BLOOD PRESSURE: 68 MMHG

## 2017-11-18 VITALS — DIASTOLIC BLOOD PRESSURE: 54 MMHG | SYSTOLIC BLOOD PRESSURE: 78 MMHG

## 2017-11-18 VITALS — DIASTOLIC BLOOD PRESSURE: 56 MMHG | SYSTOLIC BLOOD PRESSURE: 93 MMHG

## 2017-11-18 VITALS — DIASTOLIC BLOOD PRESSURE: 62 MMHG | SYSTOLIC BLOOD PRESSURE: 96 MMHG

## 2017-11-18 VITALS — SYSTOLIC BLOOD PRESSURE: 98 MMHG | DIASTOLIC BLOOD PRESSURE: 70 MMHG

## 2017-11-18 VITALS — SYSTOLIC BLOOD PRESSURE: 104 MMHG | DIASTOLIC BLOOD PRESSURE: 69 MMHG

## 2017-11-18 VITALS — DIASTOLIC BLOOD PRESSURE: 48 MMHG | SYSTOLIC BLOOD PRESSURE: 88 MMHG

## 2017-11-18 VITALS — SYSTOLIC BLOOD PRESSURE: 81 MMHG | DIASTOLIC BLOOD PRESSURE: 42 MMHG

## 2017-11-18 VITALS — DIASTOLIC BLOOD PRESSURE: 64 MMHG | SYSTOLIC BLOOD PRESSURE: 102 MMHG

## 2017-11-18 VITALS — DIASTOLIC BLOOD PRESSURE: 65 MMHG | SYSTOLIC BLOOD PRESSURE: 97 MMHG

## 2017-11-18 VITALS — DIASTOLIC BLOOD PRESSURE: 47 MMHG | SYSTOLIC BLOOD PRESSURE: 81 MMHG

## 2017-11-18 VITALS — SYSTOLIC BLOOD PRESSURE: 72 MMHG | DIASTOLIC BLOOD PRESSURE: 62 MMHG

## 2017-11-18 VITALS — DIASTOLIC BLOOD PRESSURE: 54 MMHG | SYSTOLIC BLOOD PRESSURE: 95 MMHG

## 2017-11-18 VITALS — DIASTOLIC BLOOD PRESSURE: 66 MMHG | SYSTOLIC BLOOD PRESSURE: 99 MMHG

## 2017-11-18 VITALS — DIASTOLIC BLOOD PRESSURE: 55 MMHG | SYSTOLIC BLOOD PRESSURE: 96 MMHG

## 2017-11-18 VITALS — DIASTOLIC BLOOD PRESSURE: 47 MMHG | SYSTOLIC BLOOD PRESSURE: 83 MMHG

## 2017-11-18 VITALS — SYSTOLIC BLOOD PRESSURE: 104 MMHG | DIASTOLIC BLOOD PRESSURE: 75 MMHG

## 2017-11-18 VITALS — DIASTOLIC BLOOD PRESSURE: 66 MMHG | SYSTOLIC BLOOD PRESSURE: 105 MMHG

## 2017-11-18 VITALS — SYSTOLIC BLOOD PRESSURE: 100 MMHG | DIASTOLIC BLOOD PRESSURE: 72 MMHG

## 2017-11-18 VITALS — DIASTOLIC BLOOD PRESSURE: 62 MMHG | SYSTOLIC BLOOD PRESSURE: 95 MMHG

## 2017-11-18 VITALS — DIASTOLIC BLOOD PRESSURE: 70 MMHG | SYSTOLIC BLOOD PRESSURE: 100 MMHG

## 2017-11-18 VITALS — DIASTOLIC BLOOD PRESSURE: 67 MMHG | SYSTOLIC BLOOD PRESSURE: 97 MMHG

## 2017-11-18 VITALS — SYSTOLIC BLOOD PRESSURE: 81 MMHG | DIASTOLIC BLOOD PRESSURE: 49 MMHG

## 2017-11-18 VITALS — DIASTOLIC BLOOD PRESSURE: 69 MMHG | SYSTOLIC BLOOD PRESSURE: 103 MMHG

## 2017-11-18 VITALS — DIASTOLIC BLOOD PRESSURE: 65 MMHG | SYSTOLIC BLOOD PRESSURE: 106 MMHG

## 2017-11-18 VITALS — SYSTOLIC BLOOD PRESSURE: 80 MMHG | DIASTOLIC BLOOD PRESSURE: 47 MMHG

## 2017-11-18 VITALS — SYSTOLIC BLOOD PRESSURE: 78 MMHG | DIASTOLIC BLOOD PRESSURE: 52 MMHG

## 2017-11-18 VITALS — SYSTOLIC BLOOD PRESSURE: 93 MMHG | DIASTOLIC BLOOD PRESSURE: 62 MMHG

## 2017-11-18 VITALS — DIASTOLIC BLOOD PRESSURE: 69 MMHG | SYSTOLIC BLOOD PRESSURE: 98 MMHG

## 2017-11-18 VITALS — DIASTOLIC BLOOD PRESSURE: 66 MMHG | SYSTOLIC BLOOD PRESSURE: 96 MMHG

## 2017-11-18 VITALS — SYSTOLIC BLOOD PRESSURE: 73 MMHG | DIASTOLIC BLOOD PRESSURE: 42 MMHG

## 2017-11-18 VITALS — DIASTOLIC BLOOD PRESSURE: 74 MMHG | SYSTOLIC BLOOD PRESSURE: 101 MMHG

## 2017-11-18 VITALS — DIASTOLIC BLOOD PRESSURE: 46 MMHG | SYSTOLIC BLOOD PRESSURE: 82 MMHG

## 2017-11-18 VITALS — DIASTOLIC BLOOD PRESSURE: 61 MMHG | SYSTOLIC BLOOD PRESSURE: 97 MMHG

## 2017-11-18 VITALS — DIASTOLIC BLOOD PRESSURE: 55 MMHG | SYSTOLIC BLOOD PRESSURE: 89 MMHG

## 2017-11-18 VITALS — DIASTOLIC BLOOD PRESSURE: 61 MMHG | SYSTOLIC BLOOD PRESSURE: 101 MMHG

## 2017-11-18 VITALS — SYSTOLIC BLOOD PRESSURE: 97 MMHG | DIASTOLIC BLOOD PRESSURE: 68 MMHG

## 2017-11-18 VITALS — SYSTOLIC BLOOD PRESSURE: 96 MMHG | DIASTOLIC BLOOD PRESSURE: 62 MMHG

## 2017-11-18 VITALS — SYSTOLIC BLOOD PRESSURE: 101 MMHG | DIASTOLIC BLOOD PRESSURE: 65 MMHG

## 2017-11-18 VITALS — DIASTOLIC BLOOD PRESSURE: 62 MMHG | SYSTOLIC BLOOD PRESSURE: 90 MMHG

## 2017-11-18 VITALS — SYSTOLIC BLOOD PRESSURE: 79 MMHG | DIASTOLIC BLOOD PRESSURE: 52 MMHG

## 2017-11-18 VITALS — SYSTOLIC BLOOD PRESSURE: 105 MMHG | DIASTOLIC BLOOD PRESSURE: 66 MMHG

## 2017-11-18 VITALS — DIASTOLIC BLOOD PRESSURE: 44 MMHG | SYSTOLIC BLOOD PRESSURE: 80 MMHG

## 2017-11-18 VITALS — SYSTOLIC BLOOD PRESSURE: 84 MMHG | DIASTOLIC BLOOD PRESSURE: 53 MMHG

## 2017-11-18 VITALS — SYSTOLIC BLOOD PRESSURE: 86 MMHG | DIASTOLIC BLOOD PRESSURE: 56 MMHG

## 2017-11-18 VITALS — SYSTOLIC BLOOD PRESSURE: 82 MMHG | DIASTOLIC BLOOD PRESSURE: 51 MMHG

## 2017-11-18 VITALS — DIASTOLIC BLOOD PRESSURE: 50 MMHG | SYSTOLIC BLOOD PRESSURE: 79 MMHG

## 2017-11-18 VITALS — DIASTOLIC BLOOD PRESSURE: 50 MMHG | SYSTOLIC BLOOD PRESSURE: 80 MMHG

## 2017-11-18 VITALS — SYSTOLIC BLOOD PRESSURE: 97 MMHG | DIASTOLIC BLOOD PRESSURE: 60 MMHG

## 2017-11-18 VITALS — SYSTOLIC BLOOD PRESSURE: 75 MMHG | DIASTOLIC BLOOD PRESSURE: 50 MMHG

## 2017-11-18 VITALS — SYSTOLIC BLOOD PRESSURE: 84 MMHG | DIASTOLIC BLOOD PRESSURE: 45 MMHG

## 2017-11-18 VITALS — DIASTOLIC BLOOD PRESSURE: 42 MMHG | SYSTOLIC BLOOD PRESSURE: 80 MMHG

## 2017-11-18 VITALS — SYSTOLIC BLOOD PRESSURE: 81 MMHG | DIASTOLIC BLOOD PRESSURE: 56 MMHG

## 2017-11-18 VITALS — SYSTOLIC BLOOD PRESSURE: 100 MMHG | DIASTOLIC BLOOD PRESSURE: 63 MMHG

## 2017-11-18 VITALS — SYSTOLIC BLOOD PRESSURE: 80 MMHG | DIASTOLIC BLOOD PRESSURE: 55 MMHG

## 2017-11-18 VITALS — DIASTOLIC BLOOD PRESSURE: 59 MMHG | SYSTOLIC BLOOD PRESSURE: 99 MMHG

## 2017-11-18 VITALS — DIASTOLIC BLOOD PRESSURE: 67 MMHG | SYSTOLIC BLOOD PRESSURE: 99 MMHG

## 2017-11-18 VITALS — DIASTOLIC BLOOD PRESSURE: 40 MMHG | SYSTOLIC BLOOD PRESSURE: 74 MMHG

## 2017-11-18 VITALS — SYSTOLIC BLOOD PRESSURE: 98 MMHG | DIASTOLIC BLOOD PRESSURE: 69 MMHG

## 2017-11-18 VITALS — SYSTOLIC BLOOD PRESSURE: 87 MMHG | DIASTOLIC BLOOD PRESSURE: 51 MMHG

## 2017-11-18 VITALS — SYSTOLIC BLOOD PRESSURE: 114 MMHG | DIASTOLIC BLOOD PRESSURE: 77 MMHG

## 2017-11-18 VITALS — SYSTOLIC BLOOD PRESSURE: 81 MMHG | DIASTOLIC BLOOD PRESSURE: 48 MMHG

## 2017-11-18 VITALS — DIASTOLIC BLOOD PRESSURE: 42 MMHG | SYSTOLIC BLOOD PRESSURE: 88 MMHG

## 2017-11-18 VITALS — SYSTOLIC BLOOD PRESSURE: 109 MMHG | DIASTOLIC BLOOD PRESSURE: 68 MMHG

## 2017-11-18 VITALS — SYSTOLIC BLOOD PRESSURE: 78 MMHG | DIASTOLIC BLOOD PRESSURE: 56 MMHG

## 2017-11-18 VITALS — DIASTOLIC BLOOD PRESSURE: 64 MMHG | SYSTOLIC BLOOD PRESSURE: 106 MMHG

## 2017-11-18 LAB — PLATELET COUNT, AUTOMATED: 109 K/UL (ref 150–450)

## 2017-11-18 RX ADMIN — ACETAMINOPHEN SCH MLS/HR: 10 INJECTION, SOLUTION INTRAVENOUS at 17:29

## 2017-11-18 RX ADMIN — GENTAMICIN SULFATE SCH MLS/HR: 80 INJECTION, SOLUTION INTRAVENOUS at 18:22

## 2017-11-18 RX ADMIN — PIPERACILLIN AND TAZOBACTAM SCH MLS/HR: 4; .5 INJECTION, POWDER, LYOPHILIZED, FOR SOLUTION INTRAVENOUS; PARENTERAL at 12:41

## 2017-11-18 RX ADMIN — ACETAMINOPHEN SCH MLS/HR: 10 INJECTION, SOLUTION INTRAVENOUS at 06:00

## 2017-11-18 RX ADMIN — PIPERACILLIN AND TAZOBACTAM SCH MLS/HR: 4; .5 INJECTION, POWDER, LYOPHILIZED, FOR SOLUTION INTRAVENOUS; PARENTERAL at 23:19

## 2017-11-18 RX ADMIN — KETOROLAC TROMETHAMINE PRN MG: 30 INJECTION, SOLUTION INTRAMUSCULAR; INTRAVENOUS at 20:46

## 2017-11-18 RX ADMIN — PROMETHAZINE HYDROCHLORIDE PRN MG: 25 INJECTION INTRAMUSCULAR; INTRAVENOUS at 20:59

## 2017-11-18 RX ADMIN — MORPHINE SULFATE PRN MG: 2 INJECTION, SOLUTION INTRAMUSCULAR; INTRAVENOUS at 19:16

## 2017-11-18 RX ADMIN — PIPERACILLIN AND TAZOBACTAM SCH MLS/HR: 4; .5 INJECTION, POWDER, LYOPHILIZED, FOR SOLUTION INTRAVENOUS; PARENTERAL at 00:20

## 2017-11-18 RX ADMIN — KETOROLAC TROMETHAMINE PRN MG: 30 INJECTION, SOLUTION INTRAMUSCULAR; INTRAVENOUS at 08:21

## 2017-11-18 RX ADMIN — MORPHINE SULFATE PRN MG: 2 INJECTION, SOLUTION INTRAMUSCULAR; INTRAVENOUS at 15:27

## 2017-11-18 RX ADMIN — PIPERACILLIN AND TAZOBACTAM SCH MLS/HR: 4; .5 INJECTION, POWDER, LYOPHILIZED, FOR SOLUTION INTRAVENOUS; PARENTERAL at 17:49

## 2017-11-18 RX ADMIN — PIPERACILLIN AND TAZOBACTAM SCH MLS/HR: 4; .5 INJECTION, POWDER, LYOPHILIZED, FOR SOLUTION INTRAVENOUS; PARENTERAL at 05:21

## 2017-11-18 RX ADMIN — ENOXAPARIN SODIUM SCH MG: 100 INJECTION SUBCUTANEOUS at 08:25

## 2017-11-18 RX ADMIN — THIAMINE HYDROCHLORIDE PRN MLS/HR: 100 INJECTION, SOLUTION INTRAMUSCULAR; INTRAVENOUS at 00:47

## 2017-11-18 RX ADMIN — HYDROCORTISONE SODIUM SUCCINATE SCH MG: 100 INJECTION, POWDER, FOR SOLUTION INTRAMUSCULAR; INTRAVENOUS at 08:24

## 2017-11-18 RX ADMIN — ACETAMINOPHEN SCH MLS/HR: 10 INJECTION, SOLUTION INTRAVENOUS at 12:22

## 2017-11-18 RX ADMIN — KETOROLAC TROMETHAMINE PRN MG: 30 INJECTION, SOLUTION INTRAMUSCULAR; INTRAVENOUS at 14:29

## 2017-11-18 RX ADMIN — MORPHINE SULFATE PRN MG: 2 INJECTION, SOLUTION INTRAMUSCULAR; INTRAVENOUS at 21:16

## 2017-11-18 RX ADMIN — SODIUM CHLORIDE, SODIUM LACTATE, POTASSIUM CHLORIDE, CALCIUM CHLORIDE, AND DEXTROSE MONOHYDRATE PRN MLS/HR: 600; 310; 30; 20; 5 INJECTION, SOLUTION INTRAVENOUS at 17:29

## 2017-11-18 RX ADMIN — ACETAMINOPHEN SCH MLS/HR: 10 INJECTION, SOLUTION INTRAVENOUS at 23:30

## 2017-11-18 RX ADMIN — ACETAMINOPHEN SCH MLS/HR: 10 INJECTION, SOLUTION INTRAVENOUS at 00:47

## 2017-11-18 RX ADMIN — HYDROCORTISONE SODIUM SUCCINATE SCH MG: 100 INJECTION, POWDER, FOR SOLUTION INTRAMUSCULAR; INTRAVENOUS at 20:27

## 2017-11-18 NOTE — HOSPITALIST PROGRESS NOTE
Subjective


Progress Notes


Subjective


This patient was admitted for pyelonephritis.  She had hypotension and fever 

overnight.





Patient Complains of:


Cardiovascular:  No: Chest Pain


Respiratory:  No: Shortness of Breath


Gastrointestinal:  Nausea


Musculoskeletal:  Pain





Physical Exam





Vital Signs








  Date Time  Temp Pulse Resp B/P (MAP) Pulse Ox O2 Delivery O2 Flow Rate FiO2


 


11/18/17 10:02  90      


 


11/18/17 09:45   22 84/53 (63) 92 Room Air  


 


11/18/17 07:13 98.1       


 


11/18/17 04:45       1.0 














Intake and Output 


 


 11/19/17





 07:00


 


Intake Total 186 ml


 


Output Total 300 ml


 


Balance -114 ml


 


 


 


IV Total 186 ml


 


Output Urine Total 300 ml


 


# Bowel Movements 1








Cardiovascular:  Regular Rate and Rhythm


Respiratory:  Clear to Auscultation


Extremities:  No Edema


Integumentary:  No Cyanosis


Result Diagram:  


11/18/17 0540                                                                  

              11/18/17 0540














Item Value  Date Time


 


C-Reactive Protein 27.3 mg/dl H 11/18/17 0540

















Item Value  Date Time


 


Blood Culture - Final Resulted 11/17/17 1850





Blood  


 


Blood Culture - Final Resulted 11/17/17 1815





Blood  








Monitor Interpretation:  Sinus Tachycardia





Assessment and Plan


Problems:  


(1) Sepsis


Status:  Acute


Assessment & Plan:  She presented with significant tachycardia and elevated 

lactate.  Her lactate has improved after receiving IV fluids.  She has been 

mildly hypotensive, but has not required vasopressor support.  She was started 

on stress dose hydrocortisone.  Blood cultures are growing gram negative rods.





(2) Pyelonephritis


Status:  Acute


Assessment & Plan:  She has been started on empiric treatment with Zosyn.  





(3) Nephrolithiasis


Status:  Acute


Assessment & Plan:  She does have a 4mm stone at the right ureteropelvic 

junction.  Dr. Silver has been consulted and will be bringing her to surgery 

this morning.





(4) Asthma


Status:  Chronic


Assessment & Plan:  She does use albuterol as needed.








Exam


Sepsis Risk:  Severe Sepsis Risk





Problem Qualifiers





(1) Sepsis:  


Sepsis type:  sepsis due to unspecified organism  Qualified Codes:  A41.9 - 

Sepsis, unspecified organism


(2) Asthma:  


Asthma severity:  mild  Asthma persistence:  ANDREW Godoy DO Nov 18, 2017 10:25

## 2017-11-18 NOTE — RADIOLOGY IMAGING REPORT
FACILITY: [facility] 

 

PATIENT NAME: Lissette Lindsey

: 1995

MR: [id number]

V: [id number]

EXAM DATE: 

ORDERING PHYSICIAN: JOHN STEINBERG

TECHNOLOGIST: 

 

Location: [facility]

Patient: Lissette Lindsey

: 1995

MRN: [id number]

Visit/Account:[id number]

Date of Sevice: 2017

 

ACCESSION #: [id number]

 

INTRAOPERATIVE FLUOROSCOPY

 

Comparison: CT 2017

 

History: Right ureteral stone.

 

Findings: Total fluoroscopy time is 0.05 minutes.  3 images are acquired.

 

Intraoperative fluoroscopy demonstrates cannulation of the right ureter and placement of a right uret
eral stent. On the initial image there is a questionable punctate stone in the region of the ureterop
elvic junction.

 

IMPRESSION:

 

Intraoperative fluoroscopy.  Please refer to the operative report for further discussion.

 

Report Dictated By: Phil Naidu MD at 2017 4:21 PM

 

Report E-Signed By: Phil Naidu MD  at 2017 4:26 PM

 

WSN:[id number]

## 2017-11-19 VITALS — DIASTOLIC BLOOD PRESSURE: 59 MMHG | SYSTOLIC BLOOD PRESSURE: 103 MMHG

## 2017-11-19 VITALS — SYSTOLIC BLOOD PRESSURE: 94 MMHG | DIASTOLIC BLOOD PRESSURE: 62 MMHG

## 2017-11-19 VITALS — SYSTOLIC BLOOD PRESSURE: 100 MMHG | DIASTOLIC BLOOD PRESSURE: 53 MMHG

## 2017-11-19 VITALS — SYSTOLIC BLOOD PRESSURE: 97 MMHG | DIASTOLIC BLOOD PRESSURE: 59 MMHG

## 2017-11-19 VITALS — SYSTOLIC BLOOD PRESSURE: 96 MMHG | DIASTOLIC BLOOD PRESSURE: 62 MMHG

## 2017-11-19 VITALS — DIASTOLIC BLOOD PRESSURE: 67 MMHG | SYSTOLIC BLOOD PRESSURE: 103 MMHG

## 2017-11-19 VITALS — SYSTOLIC BLOOD PRESSURE: 90 MMHG | DIASTOLIC BLOOD PRESSURE: 60 MMHG

## 2017-11-19 VITALS — DIASTOLIC BLOOD PRESSURE: 58 MMHG | SYSTOLIC BLOOD PRESSURE: 89 MMHG

## 2017-11-19 VITALS — DIASTOLIC BLOOD PRESSURE: 55 MMHG | SYSTOLIC BLOOD PRESSURE: 87 MMHG

## 2017-11-19 VITALS — DIASTOLIC BLOOD PRESSURE: 59 MMHG | SYSTOLIC BLOOD PRESSURE: 101 MMHG

## 2017-11-19 VITALS — DIASTOLIC BLOOD PRESSURE: 55 MMHG | SYSTOLIC BLOOD PRESSURE: 94 MMHG

## 2017-11-19 VITALS — SYSTOLIC BLOOD PRESSURE: 99 MMHG | DIASTOLIC BLOOD PRESSURE: 64 MMHG

## 2017-11-19 LAB — PLATELET COUNT, AUTOMATED: 120 K/UL (ref 150–450)

## 2017-11-19 RX ADMIN — ENOXAPARIN SODIUM SCH MG: 100 INJECTION SUBCUTANEOUS at 08:54

## 2017-11-19 RX ADMIN — PIPERACILLIN AND TAZOBACTAM SCH MLS/HR: 4; .5 INJECTION, POWDER, LYOPHILIZED, FOR SOLUTION INTRAVENOUS; PARENTERAL at 23:31

## 2017-11-19 RX ADMIN — KETOROLAC TROMETHAMINE PRN MG: 30 INJECTION, SOLUTION INTRAMUSCULAR; INTRAVENOUS at 03:31

## 2017-11-19 RX ADMIN — DOCUSATE SODIUM SCH MG: 100 CAPSULE, LIQUID FILLED ORAL at 08:54

## 2017-11-19 RX ADMIN — PROMETHAZINE HYDROCHLORIDE PRN MG: 25 INJECTION INTRAMUSCULAR; INTRAVENOUS at 12:29

## 2017-11-19 RX ADMIN — GENTAMICIN SULFATE SCH MLS/HR: 80 INJECTION, SOLUTION INTRAVENOUS at 10:51

## 2017-11-19 RX ADMIN — KETOROLAC TROMETHAMINE PRN MG: 30 INJECTION, SOLUTION INTRAMUSCULAR; INTRAVENOUS at 23:04

## 2017-11-19 RX ADMIN — PIPERACILLIN AND TAZOBACTAM SCH MLS/HR: 4; .5 INJECTION, POWDER, LYOPHILIZED, FOR SOLUTION INTRAVENOUS; PARENTERAL at 12:42

## 2017-11-19 RX ADMIN — MORPHINE SULFATE PRN MG: 2 INJECTION, SOLUTION INTRAMUSCULAR; INTRAVENOUS at 22:46

## 2017-11-19 RX ADMIN — ACETAMINOPHEN SCH MLS/HR: 10 INJECTION, SOLUTION INTRAVENOUS at 17:20

## 2017-11-19 RX ADMIN — ACETAMINOPHEN SCH MLS/HR: 10 INJECTION, SOLUTION INTRAVENOUS at 23:30

## 2017-11-19 RX ADMIN — KETOROLAC TROMETHAMINE PRN MG: 30 INJECTION, SOLUTION INTRAMUSCULAR; INTRAVENOUS at 08:55

## 2017-11-19 RX ADMIN — PIPERACILLIN AND TAZOBACTAM SCH MLS/HR: 4; .5 INJECTION, POWDER, LYOPHILIZED, FOR SOLUTION INTRAVENOUS; PARENTERAL at 17:50

## 2017-11-19 RX ADMIN — PROMETHAZINE HYDROCHLORIDE PRN MG: 25 INJECTION INTRAMUSCULAR; INTRAVENOUS at 19:38

## 2017-11-19 RX ADMIN — DOCUSATE SODIUM SCH MG: 100 CAPSULE, LIQUID FILLED ORAL at 21:00

## 2017-11-19 RX ADMIN — MORPHINE SULFATE PRN MG: 2 INJECTION, SOLUTION INTRAMUSCULAR; INTRAVENOUS at 19:32

## 2017-11-19 RX ADMIN — KETOROLAC TROMETHAMINE PRN MG: 30 INJECTION, SOLUTION INTRAMUSCULAR; INTRAVENOUS at 16:33

## 2017-11-19 RX ADMIN — PIPERACILLIN AND TAZOBACTAM SCH MLS/HR: 4; .5 INJECTION, POWDER, LYOPHILIZED, FOR SOLUTION INTRAVENOUS; PARENTERAL at 05:51

## 2017-11-19 RX ADMIN — MORPHINE SULFATE PRN MG: 2 INJECTION, SOLUTION INTRAMUSCULAR; INTRAVENOUS at 12:25

## 2017-11-19 RX ADMIN — ACETAMINOPHEN SCH MLS/HR: 10 INJECTION, SOLUTION INTRAVENOUS at 05:51

## 2017-11-19 RX ADMIN — ACETAMINOPHEN SCH MLS/HR: 10 INJECTION, SOLUTION INTRAVENOUS at 12:05

## 2017-11-19 RX ADMIN — GENTAMICIN SULFATE SCH MLS/HR: 80 INJECTION, SOLUTION INTRAVENOUS at 18:22

## 2017-11-19 RX ADMIN — SODIUM CHLORIDE, SODIUM LACTATE, POTASSIUM CHLORIDE, CALCIUM CHLORIDE, AND DEXTROSE MONOHYDRATE PRN MLS/HR: 600; 310; 30; 20; 5 INJECTION, SOLUTION INTRAVENOUS at 17:50

## 2017-11-19 RX ADMIN — SODIUM CHLORIDE, SODIUM LACTATE, POTASSIUM CHLORIDE, CALCIUM CHLORIDE, AND DEXTROSE MONOHYDRATE PRN MLS/HR: 600; 310; 30; 20; 5 INJECTION, SOLUTION INTRAVENOUS at 04:53

## 2017-11-19 RX ADMIN — GENTAMICIN SULFATE SCH MLS/HR: 80 INJECTION, SOLUTION INTRAVENOUS at 02:12

## 2017-11-19 RX ADMIN — MORPHINE SULFATE PRN MG: 2 INJECTION, SOLUTION INTRAMUSCULAR; INTRAVENOUS at 04:16

## 2017-11-19 NOTE — HOSPITALIST PROGRESS NOTE
Subjective


Progress Notes


Subjective


The patient reports continued right sided flank pain.





Physical Exam





Vital Signs








  Date Time  Temp Pulse Resp B/P (MAP) Pulse Ox O2 Delivery O2 Flow Rate FiO2


 


11/19/17 08:34  79      


 


11/19/17 08:16     96 Nasal Cannula 1.0 


 


11/19/17 07:45   19     


 


11/19/17 07:00 98.0   87/55 (66)    








General Appearance:  Other (eye closed, but answers questions appropriately.  

Normal wob)


Cardiovascular:  Regular Rate and Rhythm


Respiratory:  Clear to Auscultation


GI:  Soft and Non-Tender


:  Other (Right sided flank pain with palpation)


Result Diagram:  


11/19/17 0450                                                                  

              11/19/17 0450





Monitor Interpretation:  Sinus Tachycardia





Assessment and Plan


Problems:  


(1) Sepsis


Status:  Acute


Assessment & Plan:  She presented with significant tachycardia and elevated 

lactate.  Her heart rate and lactate have improved after receiving IV fluids.  

Her BP is still low normal.   Will continue hydrocortisone IV for now.  She is 

stable enough for transfer to the med/surg floor.





(2) Pyelonephritis


Status:  Acute


Assessment & Plan:  Secondary to an obstructive stone.  Stent placed on 11/18.  

No fevers since admission.  Getting Zosyn.





(3) Nephrolithiasis


Status:  Acute


Assessment & Plan:  She does have a 4mm stone at the right ureteropelvic 

junction.  See above.





(4) Asthma


Status:  Chronic


Assessment & Plan:  She does use albuterol as needed.








Exam


Sepsis Risk:  No Definite Risk





Problem Qualifiers





(1) Sepsis:  


Sepsis type:  sepsis due to unspecified organism  Qualified Codes:  A41.9 - 

Sepsis, unspecified organism


(2) Asthma:  


Asthma severity:  mild  Asthma persistence:  intermittent








PEDRO EPSTEIN MD Nov 19, 2017 10:05

## 2017-11-20 VITALS — SYSTOLIC BLOOD PRESSURE: 111 MMHG | DIASTOLIC BLOOD PRESSURE: 77 MMHG

## 2017-11-20 VITALS — SYSTOLIC BLOOD PRESSURE: 108 MMHG | DIASTOLIC BLOOD PRESSURE: 76 MMHG

## 2017-11-20 VITALS — DIASTOLIC BLOOD PRESSURE: 62 MMHG | SYSTOLIC BLOOD PRESSURE: 98 MMHG

## 2017-11-20 VITALS — SYSTOLIC BLOOD PRESSURE: 103 MMHG | DIASTOLIC BLOOD PRESSURE: 77 MMHG

## 2017-11-20 VITALS — DIASTOLIC BLOOD PRESSURE: 65 MMHG | SYSTOLIC BLOOD PRESSURE: 99 MMHG

## 2017-11-20 VITALS — SYSTOLIC BLOOD PRESSURE: 107 MMHG | DIASTOLIC BLOOD PRESSURE: 78 MMHG

## 2017-11-20 LAB — PLATELET COUNT, AUTOMATED: 145 K/UL (ref 150–450)

## 2017-11-20 RX ADMIN — PROMETHAZINE HYDROCHLORIDE PRN MG: 25 INJECTION INTRAMUSCULAR; INTRAVENOUS at 02:50

## 2017-11-20 RX ADMIN — PROMETHAZINE HYDROCHLORIDE PRN MG: 25 INJECTION INTRAMUSCULAR; INTRAVENOUS at 17:00

## 2017-11-20 RX ADMIN — SODIUM CHLORIDE, SODIUM LACTATE, POTASSIUM CHLORIDE, CALCIUM CHLORIDE, AND DEXTROSE MONOHYDRATE PRN MLS/HR: 600; 310; 30; 20; 5 INJECTION, SOLUTION INTRAVENOUS at 08:05

## 2017-11-20 RX ADMIN — PROMETHAZINE HYDROCHLORIDE PRN MG: 25 INJECTION INTRAMUSCULAR; INTRAVENOUS at 23:37

## 2017-11-20 RX ADMIN — DEXTROSE MONOHYDRATE, SODIUM CHLORIDE, AND POTASSIUM CHLORIDE PRN MLS/HR: 50; 9; 1.49 INJECTION, SOLUTION INTRAVENOUS at 09:42

## 2017-11-20 RX ADMIN — ACETAMINOPHEN SCH MLS/HR: 10 INJECTION, SOLUTION INTRAVENOUS at 13:29

## 2017-11-20 RX ADMIN — ACETAMINOPHEN SCH MLS/HR: 10 INJECTION, SOLUTION INTRAVENOUS at 17:24

## 2017-11-20 RX ADMIN — GENTAMICIN SULFATE SCH MLS/HR: 80 INJECTION, SOLUTION INTRAVENOUS at 18:22

## 2017-11-20 RX ADMIN — PIPERACILLIN AND TAZOBACTAM SCH MLS/HR: 4; .5 INJECTION, POWDER, LYOPHILIZED, FOR SOLUTION INTRAVENOUS; PARENTERAL at 17:40

## 2017-11-20 RX ADMIN — KETOROLAC TROMETHAMINE PRN MG: 30 INJECTION, SOLUTION INTRAMUSCULAR; INTRAVENOUS at 08:05

## 2017-11-20 RX ADMIN — PROMETHAZINE HYDROCHLORIDE PRN MG: 25 INJECTION INTRAMUSCULAR; INTRAVENOUS at 09:58

## 2017-11-20 RX ADMIN — PIPERACILLIN AND TAZOBACTAM SCH MLS/HR: 4; .5 INJECTION, POWDER, LYOPHILIZED, FOR SOLUTION INTRAVENOUS; PARENTERAL at 05:53

## 2017-11-20 RX ADMIN — DEXTROSE MONOHYDRATE, SODIUM CHLORIDE, AND POTASSIUM CHLORIDE PRN MLS/HR: 50; 9; 1.49 INJECTION, SOLUTION INTRAVENOUS at 21:45

## 2017-11-20 RX ADMIN — MORPHINE SULFATE PRN MG: 2 INJECTION, SOLUTION INTRAMUSCULAR; INTRAVENOUS at 13:28

## 2017-11-20 RX ADMIN — ACETAMINOPHEN SCH MLS/HR: 10 INJECTION, SOLUTION INTRAVENOUS at 05:27

## 2017-11-20 RX ADMIN — DOCUSATE SODIUM SCH MG: 100 CAPSULE, LIQUID FILLED ORAL at 20:56

## 2017-11-20 RX ADMIN — GENTAMICIN SULFATE SCH MLS/HR: 80 INJECTION, SOLUTION INTRAVENOUS at 10:01

## 2017-11-20 RX ADMIN — PIPERACILLIN AND TAZOBACTAM SCH MLS/HR: 4; .5 INJECTION, POWDER, LYOPHILIZED, FOR SOLUTION INTRAVENOUS; PARENTERAL at 23:20

## 2017-11-20 RX ADMIN — PIPERACILLIN AND TAZOBACTAM SCH MLS/HR: 4; .5 INJECTION, POWDER, LYOPHILIZED, FOR SOLUTION INTRAVENOUS; PARENTERAL at 13:40

## 2017-11-20 RX ADMIN — ENOXAPARIN SODIUM SCH MG: 100 INJECTION SUBCUTANEOUS at 09:41

## 2017-11-20 RX ADMIN — GENTAMICIN SULFATE SCH MLS/HR: 80 INJECTION, SOLUTION INTRAVENOUS at 02:47

## 2017-11-20 RX ADMIN — MORPHINE SULFATE PRN MG: 2 INJECTION, SOLUTION INTRAMUSCULAR; INTRAVENOUS at 09:36

## 2017-11-20 RX ADMIN — KETOROLAC TROMETHAMINE PRN MG: 30 INJECTION, SOLUTION INTRAMUSCULAR; INTRAVENOUS at 23:37

## 2017-11-20 RX ADMIN — ACETAMINOPHEN SCH MLS/HR: 10 INJECTION, SOLUTION INTRAVENOUS at 23:20

## 2017-11-20 RX ADMIN — MORPHINE SULFATE PRN MG: 2 INJECTION, SOLUTION INTRAMUSCULAR; INTRAVENOUS at 21:46

## 2017-11-20 RX ADMIN — MORPHINE SULFATE PRN MG: 2 INJECTION, SOLUTION INTRAMUSCULAR; INTRAVENOUS at 03:48

## 2017-11-20 RX ADMIN — DOCUSATE SODIUM SCH MG: 100 CAPSULE, LIQUID FILLED ORAL at 09:41

## 2017-11-20 RX ADMIN — MORPHINE SULFATE PRN MG: 2 INJECTION, SOLUTION INTRAMUSCULAR; INTRAVENOUS at 19:45

## 2017-11-20 NOTE — HOSPITALIST PROGRESS NOTE
Subjective


Progress Notes


Subjective


She reports some minor improvements. Still not much appetite. Temperature max 

is down. WBC count improved.





Physical Exam





Vital Signs








  Date Time  Temp Pulse Resp B/P (MAP) Pulse Ox O2 Delivery O2 Flow Rate FiO2


 


11/20/17 11:13  94 20  91 Nasal Cannula 2.0 


 


11/20/17 07:59 98.9   99/65 (76)    














Intake and Output 


 


 11/21/17





 07:00


 


Intake Total 296 ml


 


Output Total 350 ml


 


Balance -54 ml


 


 


 


IV Total 296 ml


 


Output Urine Total 350 ml








General Appearance:  Alert, Awake


Cardiovascular:  Regular Rate and Rhythm


Respiratory:  Clear to Auscultation


GI:  Other (soft/but tenderness reported with palpation of RUQ/flank)


Extremities:  Warm, Perfused


Result Diagram:  


11/20/17 0535                                                                  

              11/20/17 0535








Assessment and Plan


Problems:  


(1) Sepsis


Status:  Acute


Assessment & Plan:  She presented with significant tachycardia and elevated 

lactate.  Her heart rate and lactate have improved after receiving IV fluids. 

Blood and urine cultures are growing E. coli, which is sensitive to the IV 

Zosyn she is currently on. Her BP is still low normal.  Temperature has 

improved. Clinically, she is improving albeit slowly. Need to have a minimum of 

72 hours afebrile on IV antibiotics before entertaining change to oral.





(2) Pyelonephritis


Status:  Acute


Assessment & Plan:  Secondary to an obstructing stone.  Stent placed on 11/18 

with Dr. Silver.  On IV Zosyn. Temperature max in past 24 hours has been 99.2F. 

All cultures are growing E. coli that is sensitive to the Zosyn.





(3) Nephrolithiasis


Status:  Acute


Assessment & Plan:  She has a 4mm stone at the right ureteropelvic junction.  

Stent placed with Dr. Silver - see above.





(4) Asthma


Status:  Chronic


Assessment & Plan:  Stable. She does use albuterol as needed.





(5) Hypokalemia


Status:  Acute


Assessment & Plan:  Will replace with IV supplementation. Watch labs.








Exam


Sepsis Risk:  No Definite Risk





Problem Qualifiers





(1) Sepsis:  


Sepsis type:  sepsis due to unspecified organism  Qualified Codes:  A41.9 - 

Sepsis, unspecified organism


(2) Asthma:  


Asthma severity:  mild  Asthma persistence:  intermittent








KIMBERLYN VASQUEZ MD Nov 20, 2017 11:47

## 2017-11-21 VITALS — DIASTOLIC BLOOD PRESSURE: 84 MMHG | SYSTOLIC BLOOD PRESSURE: 113 MMHG

## 2017-11-21 VITALS — SYSTOLIC BLOOD PRESSURE: 124 MMHG | DIASTOLIC BLOOD PRESSURE: 87 MMHG

## 2017-11-21 VITALS — SYSTOLIC BLOOD PRESSURE: 118 MMHG | DIASTOLIC BLOOD PRESSURE: 85 MMHG

## 2017-11-21 VITALS — DIASTOLIC BLOOD PRESSURE: 86 MMHG | SYSTOLIC BLOOD PRESSURE: 131 MMHG

## 2017-11-21 VITALS — DIASTOLIC BLOOD PRESSURE: 71 MMHG | SYSTOLIC BLOOD PRESSURE: 117 MMHG

## 2017-11-21 VITALS — SYSTOLIC BLOOD PRESSURE: 121 MMHG | DIASTOLIC BLOOD PRESSURE: 83 MMHG

## 2017-11-21 LAB — PLATELET COUNT, AUTOMATED: 167 K/UL (ref 150–450)

## 2017-11-21 PROCEDURE — 0T768DZ DILATION OF RIGHT URETER WITH INTRALUMINAL DEVICE, VIA NATURAL OR ARTIFICIAL OPENING ENDOSCOPIC: ICD-10-PCS | Performed by: UROLOGY

## 2017-11-21 RX ADMIN — MORPHINE SULFATE PRN MG: 2 INJECTION, SOLUTION INTRAMUSCULAR; INTRAVENOUS at 03:05

## 2017-11-21 RX ADMIN — DOCUSATE SODIUM SCH MG: 100 CAPSULE, LIQUID FILLED ORAL at 08:45

## 2017-11-21 RX ADMIN — PROMETHAZINE HYDROCHLORIDE PRN MG: 25 INJECTION INTRAMUSCULAR; INTRAVENOUS at 19:38

## 2017-11-21 RX ADMIN — ENOXAPARIN SODIUM SCH MG: 100 INJECTION SUBCUTANEOUS at 08:46

## 2017-11-21 RX ADMIN — GENTAMICIN SULFATE SCH MLS/HR: 80 INJECTION, SOLUTION INTRAVENOUS at 02:08

## 2017-11-21 RX ADMIN — ACETAMINOPHEN SCH MLS/HR: 10 INJECTION, SOLUTION INTRAVENOUS at 05:38

## 2017-11-21 RX ADMIN — POTASSIUM CHLORIDE SCH MEQ: 1500 TABLET, EXTENDED RELEASE ORAL at 17:30

## 2017-11-21 RX ADMIN — PIPERACILLIN AND TAZOBACTAM SCH MLS/HR: 4; .5 INJECTION, POWDER, LYOPHILIZED, FOR SOLUTION INTRAVENOUS; PARENTERAL at 05:39

## 2017-11-21 RX ADMIN — DOCUSATE SODIUM SCH MG: 100 CAPSULE, LIQUID FILLED ORAL at 20:46

## 2017-11-21 RX ADMIN — MORPHINE SULFATE PRN MG: 2 INJECTION, SOLUTION INTRAMUSCULAR; INTRAVENOUS at 07:40

## 2017-11-21 RX ADMIN — KETOROLAC TROMETHAMINE PRN MG: 30 INJECTION, SOLUTION INTRAMUSCULAR; INTRAVENOUS at 05:55

## 2017-11-21 RX ADMIN — POTASSIUM CHLORIDE SCH MEQ: 1500 TABLET, EXTENDED RELEASE ORAL at 08:45

## 2017-11-21 RX ADMIN — PROMETHAZINE HYDROCHLORIDE PRN MG: 25 INJECTION INTRAMUSCULAR; INTRAVENOUS at 09:22

## 2017-11-21 NOTE — HOSPITALIST PROGRESS NOTE
Subjective


Progress Notes


Subjective


This patient was admitted for pyelonephritis.  She had no acute events 

overnight.





Patient Complains of:


Cardiovascular:  No: Chest Pain


Respiratory:  No: Shortness of Breath





Physical Exam





Vital Signs








  Date Time  Temp Pulse Resp B/P (MAP) Pulse Ox O2 Delivery O2 Flow Rate FiO2


 


11/21/17 08:54     91 Room Air  


 


11/21/17 07:57       1.0 


 


11/21/17 07:15 99.4 84 16 118/85 (96)    














Intake and Output 


 


 11/22/17





 07:00


 


 


 


# Voids 1








Cardiovascular:  Regular Rate and Rhythm


Respiratory:  Clear to Auscultation


Result Diagram:  


11/21/17 0528                                                                  

              11/21/17 0528














Item Value  Date Time


 


Urine Culture - Final Complete 11/18/17 0000





Kidney Urine Right Escherichia Coli 


 


Blood Culture - Final Complete 11/17/17 1850





Blood  


 


Blood Culture - Final Complete 11/17/17 1815





Blood  











Assessment and Plan


Problems:  


(1) Sepsis


Status:  Acute


Assessment & Plan:  She presented with significant tachycardia and elevated 

lactate.  Her heart rate and lactate have improved after receiving IV fluids. 

Blood and urine cultures are growing E. coli.





(2) Pyelonephritis


Status:  Acute


Assessment & Plan:  Secondary to an obstructing stone.  Her cultures have grown 

E.coli.  She has been on empiric treatment with Zosyn and gentamicin.  We have 

converted her to IV levofloxacin today, but she can be converted to oral 

treatment once her nausea has resolved.





(3) Nephrolithiasis


Status:  Acute


Assessment & Plan:  She had a 4mm stone at the right ureteropelvic junction.  A 

stent was placed by Dr. Silver.





(4) Asthma


Status:  Chronic


Assessment & Plan:  Stable. She does use albuterol as needed.





(5) Hypokalemia


Status:  Acute


Assessment & Plan:  Will replace with IV supplementation. Watch labs.








Exam


Sepsis Risk:  No Definite Risk





Problem Qualifiers





(1) Sepsis:  


Sepsis type:  sepsis due to unspecified organism  Qualified Codes:  A41.9 - 

Sepsis, unspecified organism


(2) Asthma:  


Asthma severity:  mild  Asthma persistence:  ANDREW Godoy DO Nov 21, 2017 10:28

## 2017-11-22 VITALS — DIASTOLIC BLOOD PRESSURE: 74 MMHG | SYSTOLIC BLOOD PRESSURE: 116 MMHG

## 2017-11-22 VITALS — DIASTOLIC BLOOD PRESSURE: 79 MMHG | SYSTOLIC BLOOD PRESSURE: 119 MMHG

## 2017-11-22 VITALS — SYSTOLIC BLOOD PRESSURE: 117 MMHG | DIASTOLIC BLOOD PRESSURE: 81 MMHG

## 2017-11-22 VITALS — SYSTOLIC BLOOD PRESSURE: 131 MMHG | DIASTOLIC BLOOD PRESSURE: 90 MMHG

## 2017-11-22 RX ADMIN — DOCUSATE SODIUM SCH MG: 100 CAPSULE, LIQUID FILLED ORAL at 08:17

## 2017-11-22 RX ADMIN — ENOXAPARIN SODIUM SCH MG: 100 INJECTION SUBCUTANEOUS at 08:18

## 2017-11-22 RX ADMIN — PROMETHAZINE HYDROCHLORIDE PRN MG: 25 INJECTION INTRAMUSCULAR; INTRAVENOUS at 04:21

## 2017-11-22 RX ADMIN — PROMETHAZINE HYDROCHLORIDE PRN MG: 25 INJECTION INTRAMUSCULAR; INTRAVENOUS at 17:16

## 2017-11-22 RX ADMIN — POTASSIUM CHLORIDE SCH MEQ: 1500 TABLET, EXTENDED RELEASE ORAL at 17:16

## 2017-11-22 RX ADMIN — DOCUSATE SODIUM SCH MG: 100 CAPSULE, LIQUID FILLED ORAL at 20:56

## 2017-11-22 RX ADMIN — POTASSIUM CHLORIDE SCH MEQ: 1500 TABLET, EXTENDED RELEASE ORAL at 08:17

## 2017-11-22 RX ADMIN — PANTOPRAZOLE SODIUM SCH MG: 20 TABLET, DELAYED RELEASE ORAL at 21:01

## 2017-11-22 RX ADMIN — LEVOFLOXACIN SCH MLS/HR: 5 INJECTION, SOLUTION INTRAVENOUS at 12:21

## 2017-11-22 NOTE — RADIOLOGY IMAGING REPORT
FACILITY: [facility] [place] 

 

PATIENT NAME: Lissette Lindsey

: 1995

MR: [id number]

V: [id number]

EXAM DATE: 

ORDERING PHYSICIAN: PEDRO EPSTEIN

TECHNOLOGIST: 

 

[place] [facility] [place]

Patient: Lissette Lindsey

: 1995

MRN: [id number]

Visit/Account:[id number]

Date of Sevice: 2017

 

ACCESSION #: [id number]

 

IVP W W/O CONTRAST

 

HISTORY:  pylonephritis with stone, sepsis

 

TECHNIQUE: Axial images acquired through the abdomen/pelvis both with and without IV contrast..  Ez
nal and sagittal reformatting also performed. Dose Lowering Technique

 

One of the following dose optimization techniques was utilized in the performance of this exam: Autom
ated exposure control; adjustment of the mA and/or kV according to the patient's size; or use of an i
terative  reconstruction technique.  Specific details can be referenced in the facility's radiology C
T exam operational policy.

 

 

 

CONTRAST:  75 mL Isovue-370

 

COMPARISON:  CT and pelvis 2017

 

FINDINGS:

 

Visualized lung bases:  There has been interval development of small posterior layering bilateral ple
ural effusions and compressive atelectasis in the lower lobes

 

Hepatobiliary:  There are postsurgical changes from a cholecystectomy

 

Spleen:  Negative.

 

Adrenals:  Negative.

 

Pancreas:  Negative.

 

Kidneys ureters and bladder: There is now a right ureteral stent in place.  The previously noted 4 mm
 calculus at the right UPJ is now in the lower pole calyx.  There is a persistent striated nephrogram
 on the right with right periureteral stranding which is slightly increased.  Perinephric stranding a
long the right pararenal space has increased slightly in the interim.

 

The left kidney appears grossly unremarkable

 

Genitalia:  There is a 1.6 cm right ovarian cyst

 

GI:  Negative.

 

Vessels/spaces/nodes:  There are shotty mesenteric and right peroneal lymph nodes.

 

Bones/soft tissues:  There is an umbilical hernia containing fat.  There is extensive infiltrative ch
anges seen in the subcutaneous fat along both flanks new since the prior study

 

Additional findings:  There is a moderate amount of free fluid seen within the pelvis which is new wh
en compared to the prior study

 

IMPRESSION:

 

There is been interval placement of a right ureteral stent when compared the prior study.  The proxim
al pigtail the stent appears to be in the right renal pelvis.  The distal portion of the stent appear
s to project just outside the posterior right side of the bladder..  The previously noted 4 mm calcul
us is now seen in a lower pole calyx on the right.

 

There is a persistent striated nephrogram on the right consistent with pyelonephritis.  There is a sl
ight increase in the periureteral and perinephric stranding on the right.

 

There is moderate amount of free fluid seen within the pelvis.  This may represent simple free fluid 
as it does not appear hyperdense on the delayed postcontrast images.

 

Interval development of small posterior layering bilateral pleural effusions and compressive atelecta
sis lower lobes

 

Findings were discussed by telephone with Dr. Pedro Epstein at approximately 1425 on 2017

 

Findings were discussed with Dr. Silver by telephone at approximately 1446 on 2017

 

Report Dictated By: Corinne Madrigal MD at 2017 2:10 PM

 

Report E-Signed By: Corinne Madrigal MD  at 2017 2:48 PM

 

SAMN:AMICIVANGELA

## 2017-11-22 NOTE — HOSPITALIST PROGRESS NOTE
Subjective


Progress Notes


Subjective


Still with flank pain and poor appetite.





Physical Exam





Vital Signs








  Date Time  Temp Pulse Resp B/P (MAP) Pulse Ox O2 Delivery O2 Flow Rate FiO2


 


11/22/17 07:31     88   


 


11/22/17 06:54 98.8 93 24 117/81 (93)  Nasal Cannula 2.0 














Intake and Output 


 


 11/23/17





 07:00


 


Output Total 850 ml


 


Balance -850 ml


 


 


 


Output Urine Total 800 ml


 


Emesis 50 ml


 


# Voids 1


 


# Emeses 1








General Appearance:  Alert, Awake, Other (Soft spoken, breathing comfortably, 

pale)


:  Other (right flank pain with palpation)


Result Diagram:  


11/21/17 0528                                                                  

              11/22/17 0551








Assessment and Plan


Problems:  


(1) Sepsis


Status:  Resolved


Assessment & Plan:  She presented with significant tachycardia and elevated 

lactate.  Her heart rate and lactate have improved after receiving IV fluids. 

Blood and urine cultures are growing E. coli.





(2) Pyelonephritis


Status:  Acute


Assessment & Plan:  Secondary to an obstructing stone.  Her cultures have grown 

E.coli.  She has been on empiric treatment with Zosyn and gentamicin.  We have 

converted her to IV levofloxacin today, but she can be converted to oral 

treatment once her nausea has resolved.  Will get a CT urogram to evaluate for 

abscess or obstruction.





(3) Nephrolithiasis


Status:  Acute


Assessment & Plan:  She had a 4mm stone at the right ureteropelvic junction.  A 

stent was placed by Dr. Silver.





(4) Asthma


Status:  Chronic


Assessment & Plan:  Stable. She does use albuterol as needed.





(5) Hypokalemia


Status:  Acute


Assessment & Plan:  Will replace with IV supplementation. Watch labs.  Mg is 

low so will replace.








Exam


Sepsis Risk:  No Definite Risk





Problem Qualifiers





(1) Sepsis:  


Sepsis type:  sepsis due to unspecified organism  Qualified Codes:  A41.9 - 

Sepsis, unspecified organism


(2) Asthma:  


Asthma severity:  mild  Asthma persistence:  intermittent








PEDRO EPSTEIN MD Nov 22, 2017 11:20

## 2017-11-22 NOTE — MEDICAL NUTRITION THERAPY
Nutrition Anthropometrics


Height (Inches):  62.00


Height (Calculated Centimeters:  157.500748


Weight (Pounds):  202


Weight (Calculated Kilograms):  91.824


BMI Calculated:  36.94


Ezequiel Nutrition Score:         Adequate 


Ezequiel Nutrition Risk Score:  21


Dietary Referral


Nutrition Risk Factors:      


Nutrition Risk Comment:





Physical Findings


Physical Appearance:  Obese BMI 30-39


Skin Appearance


Skin Appearance:


Edema


Edema Location Modifier:  


Edema Location:               


Type of Edema:                 


Degree of Edema:


Gastrointestinal Symptoms


GI Symtoms:                Nausea, Appetite Changes 


Tube Present:               


Bowel Sounds:              


Recent Bowel Pattern:    


Stool Characteristics:





Nutritional Diagnosis


Nutritional Risk Acuity 2:  Pr Appetite > 3d


Nutritional Risk Acuity 3:  Nausea


Past Medical History:  


Cholecystectomy, tonsillectomy, migraine, asthma, deression


Nutritional Acuity:  2-Moderate (alb 2.2)


Nutrition Diagnosis:  Inadequate Food Intake


Nutrition Etiology:  Physiological Causes


Nutrition Problem/Etiology/Sym:  


r/t nausea AEB average intake 23% past 4 days.


Adjusted Energy Requirement Re:  1905 (H-B adj for obesity)


Protein Requirement:  91 (1gm/kg)


Fluid Requirement:  2275 (25ml/kg)


Diet Type:  Diet as Tolerated FELIX/REG


Nutrition Intervention:  Cont diet as ordered, Encourage intake, Between meal 

supplement


Additional Diet Restrictions:  OFFER NUTR SUPPLMENT  IF REFUSE MEAL OR INTAKE < 

50%





Nutrition Monitoring & Eval


Nutrition Goals:  Eat % Meal


Nutrition Follow-Up:  Poor Intake


RD Patient Assessment Time:  30 minutes


RD Assessment Type:  RD Assessment


Patient Nutrition Acuity:  2-Moderate


Follow Up Date:  Nov 24, 2017


Nutritional Comment:  


Pt admitted with abdominal pain, nausea and migraine HA and will be  


treated in ICU for pyelonephritis. Currently on FELIX with intake of 75% of  


one meal. BMI in obese category. Notable labs include low H/H, elevated  


liver enzymes, CRP 27.3, total pro 4.7 and alb 2.3. Will cont to monitor  


and encourage intake. 





11/22  Intake average 23%.  Refused supper tray today stating r/t nausea.


Alb declined to 2.2 Recommend nutr support if pt cont poor intake.











BHUMIKA JOE Nov 22, 2017 15:56

## 2017-11-23 VITALS — DIASTOLIC BLOOD PRESSURE: 81 MMHG | SYSTOLIC BLOOD PRESSURE: 123 MMHG

## 2017-11-23 VITALS — SYSTOLIC BLOOD PRESSURE: 125 MMHG | DIASTOLIC BLOOD PRESSURE: 85 MMHG

## 2017-11-23 VITALS — SYSTOLIC BLOOD PRESSURE: 118 MMHG | DIASTOLIC BLOOD PRESSURE: 80 MMHG

## 2017-11-23 VITALS — SYSTOLIC BLOOD PRESSURE: 117 MMHG | DIASTOLIC BLOOD PRESSURE: 76 MMHG

## 2017-11-23 VITALS — SYSTOLIC BLOOD PRESSURE: 113 MMHG | DIASTOLIC BLOOD PRESSURE: 86 MMHG

## 2017-11-23 LAB
PLATELET COUNT, AUTOMATED: 251 K/UL (ref 150–450)
PLATELET COUNT, AUTOMATED: 275 K/UL (ref 150–450)

## 2017-11-23 RX ADMIN — LEVOFLOXACIN SCH MLS/HR: 5 INJECTION, SOLUTION INTRAVENOUS at 09:32

## 2017-11-23 RX ADMIN — PANTOPRAZOLE SODIUM SCH MG: 20 TABLET, DELAYED RELEASE ORAL at 08:25

## 2017-11-23 RX ADMIN — ENOXAPARIN SODIUM SCH MG: 100 INJECTION SUBCUTANEOUS at 08:25

## 2017-11-23 RX ADMIN — ACETAMINOPHEN PRN MG: 325 TABLET ORAL at 17:57

## 2017-11-23 RX ADMIN — DOCUSATE SODIUM SCH MG: 100 CAPSULE, LIQUID FILLED ORAL at 20:11

## 2017-11-23 RX ADMIN — POTASSIUM CHLORIDE SCH MEQ: 1500 TABLET, EXTENDED RELEASE ORAL at 08:24

## 2017-11-23 RX ADMIN — POTASSIUM CHLORIDE SCH MEQ: 1500 TABLET, EXTENDED RELEASE ORAL at 16:19

## 2017-11-23 RX ADMIN — DOCUSATE SODIUM SCH MG: 100 CAPSULE, LIQUID FILLED ORAL at 08:25

## 2017-11-23 NOTE — HOSPITALIST PROGRESS NOTE
Subjective


Progress Notes


Subjective


She reports feeling improved. Less pain. Appetite better. No significant fever.





Physical Exam





Vital Signs








  Date Time  Temp Pulse Resp B/P (MAP) Pulse Ox O2 Delivery O2 Flow Rate FiO2


 


11/23/17 08:30     94 Room Air  


 


11/23/17 08:21 98.4 89 18 113/86 (95)    


 


11/22/17 06:54       2.0 














Intake and Output 


 


 11/24/17





 07:00


 


Output Total 700 ml


 


Balance -700 ml


 


 


 


Output Urine Total 700 ml








General Appearance:  Alert, Awake


Cardiovascular:  Regular Rate and Rhythm


Respiratory:  Clear to Auscultation


GI:  Other (soft with some tenderness over right flank)


Extremities:  Warm, Perfused


Result Diagram:  


11/23/17 0720 11/23/17 0720








Assessment and Plan


Problems:  


(1) Pyelonephritis


Status:  Acute


Assessment & Plan:  Secondary to an obstructing stone.  Her cultures have grown 

E.coli.  She was on empiric treatment with IV Zosyn and gentamicin.  We have 

converted her to IV levofloxacin, but she can be converted to oral treatment 

once her nausea has resolved.  CT done yesterday did not reveal any abscess or 

obstruction. Dr. Silver has reviewed as there was a question if the pig-tail 

portion of the stent was out of position. He feels it is in correct position at 

this time.





(2) Sepsis


Status:  Resolved


Assessment & Plan:  She presented with significant tachycardia and elevated 

lactate.  Her heart rate and lactate have improved after receiving IV fluids. 

Blood and urine cultures are growing E. coli.





(3) Nephrolithiasis


Status:  Acute


Assessment & Plan:  She had a 4mm stone at the right ureteropelvic junction.  A 

stent was placed by Dr. Silver.





(4) Asthma


Status:  Chronic


Assessment & Plan:  Stable. She does use albuterol as needed.





(5) Hypokalemia


Status:  Acute


Assessment & Plan:  Will replace with IV supplementation. Re-check labs.  Mg++ 

was low so we replaced with IV supplement yesterday (normal this AM at 1.9).








Exam


Sepsis Risk:  No Definite Risk





Problem Qualifiers





(1) Sepsis:  


Sepsis type:  sepsis due to unspecified organism  Qualified Codes:  A41.9 - 

Sepsis, unspecified organism


(2) Asthma:  


Asthma severity:  mild  Asthma persistence:  intermittent








KIMBERLYN VASQUEZ MD Nov 23, 2017 11:03

## 2017-11-24 VITALS — DIASTOLIC BLOOD PRESSURE: 79 MMHG | SYSTOLIC BLOOD PRESSURE: 115 MMHG

## 2017-11-24 VITALS — SYSTOLIC BLOOD PRESSURE: 108 MMHG | DIASTOLIC BLOOD PRESSURE: 76 MMHG

## 2017-11-24 VITALS — DIASTOLIC BLOOD PRESSURE: 67 MMHG | SYSTOLIC BLOOD PRESSURE: 107 MMHG

## 2017-11-24 VITALS — SYSTOLIC BLOOD PRESSURE: 111 MMHG | DIASTOLIC BLOOD PRESSURE: 78 MMHG

## 2017-11-24 VITALS — SYSTOLIC BLOOD PRESSURE: 112 MMHG | DIASTOLIC BLOOD PRESSURE: 74 MMHG

## 2017-11-24 LAB — PLATELET COUNT, AUTOMATED: 277 K/UL (ref 150–450)

## 2017-11-24 RX ADMIN — AMOXICILLIN AND CLAVULANATE POTASSIUM SCH MG: 875; 125 TABLET, FILM COATED ORAL at 10:15

## 2017-11-24 RX ADMIN — PANTOPRAZOLE SODIUM SCH MG: 20 TABLET, DELAYED RELEASE ORAL at 08:38

## 2017-11-24 RX ADMIN — ACETAMINOPHEN PRN MG: 325 TABLET ORAL at 07:41

## 2017-11-24 RX ADMIN — ENOXAPARIN SODIUM SCH MG: 100 INJECTION SUBCUTANEOUS at 08:39

## 2017-11-24 RX ADMIN — POTASSIUM CHLORIDE SCH MEQ: 1500 TABLET, EXTENDED RELEASE ORAL at 07:41

## 2017-11-24 RX ADMIN — LEVOFLOXACIN SCH MG: 750 TABLET, FILM COATED ORAL at 10:14

## 2017-11-24 RX ADMIN — AMOXICILLIN AND CLAVULANATE POTASSIUM SCH MG: 875; 125 TABLET, FILM COATED ORAL at 17:20

## 2017-11-24 RX ADMIN — DOCUSATE SODIUM SCH MG: 100 CAPSULE, LIQUID FILLED ORAL at 21:31

## 2017-11-24 RX ADMIN — POTASSIUM CHLORIDE SCH MEQ: 1500 TABLET, EXTENDED RELEASE ORAL at 17:20

## 2017-11-24 RX ADMIN — DOCUSATE SODIUM SCH MG: 100 CAPSULE, LIQUID FILLED ORAL at 08:38

## 2017-11-24 NOTE — HOSPITALIST PROGRESS NOTE
Subjective


Progress Notes


Subjective


This patient was admitted for pyelonephritis.  She had no acute events 

overnight.





Patient Complains of:


Cardiovascular:  No: Chest Pain


Respiratory:  No: Shortness of Breath





Physical Exam





Vital Signs








  Date Time  Temp Pulse Resp B/P (MAP) Pulse Ox O2 Delivery O2 Flow Rate FiO2


 


11/24/17 07:46     92 Room Air  


 


11/24/17 07:33 98.2 69 20 115/79 (91)    


 


11/22/17 06:54       2.0 














Intake and Output 


 


 11/25/17





 07:00


 


Output Total 600 ml


 


Balance -600 ml


 


 


 


Output Urine Total 600 ml








Cardiovascular:  Regular Rate and Rhythm


Respiratory:  Clear to Auscultation


Result Diagram:  


11/24/17 0548 11/24/17 0548








Assessment and Plan


Problems:  


(1) Pyelonephritis


Status:  Acute


Assessment & Plan:  Secondary to an obstructing stone.  Her cultures have grown 

E.coli.  She was on empiric treatment with IV Zosyn and gentamicin.  We then 

converted her to IV levofloxacin.  We lost IV access today.  She will be placed 

on oral levofloxacin.  Dr. Silver ordered Zosyn earlier this morning, so we 

switched that to Augmentin when we lost IV.





(2) Sepsis


Status:  Resolved


Assessment & Plan:  She presented with significant tachycardia and elevated 

lactate.  Her heart rate and lactate have improved after receiving IV fluids. 

Blood and urine cultures are growing E. coli.





(3) Nephrolithiasis


Status:  Acute


Assessment & Plan:  She had a 4mm stone at the right ureteropelvic junction.  A 

stent was placed by Dr. Silver.





(4) Asthma


Status:  Chronic


Assessment & Plan:  She does use albuterol as needed.





(5) Hypokalemia


Status:  Acute


Assessment & Plan:  Resolved with supplementation.








Exam


Sepsis Risk:  No Definite Risk





Problem Qualifiers





(1) Sepsis:  


Sepsis type:  sepsis due to unspecified organism  Qualified Codes:  A41.9 - 

Sepsis, unspecified organism


(2) Asthma:  


Asthma severity:  mild  Asthma persistence:  ANDREW Godoy DO Nov 24, 2017 09:26

## 2017-11-24 NOTE — MEDICAL NUTRITION THERAPY
Nutrition Anthropometrics


Height (Inches):  62.00


Height (Calculated Centimeters:  157.423569


Weight (Pounds):  202


Weight (Calculated Kilograms):  91.824


BMI Calculated:  36.94


Ezequiel Nutrition Score:         Adequate 


Ezequiel Nutrition Risk Score:  22


Dietary Referral


Nutrition Risk Factors:      


Nutrition Risk Comment:





Physical Findings


Physical Appearance:  Obese BMI 30-39


Skin Appearance


Skin Appearance:


Edema


Edema Location Modifier:  


Edema Location:               


Type of Edema:                 


Degree of Edema:


Gastrointestinal Symptoms


GI Symtoms:                Nausea, Appetite Changes 


Tube Present:               


Bowel Sounds:              


Recent Bowel Pattern:    


Stool Characteristics:





Nutritional Diagnosis


Nutritional Risk Acuity 2:  Pr Appetite > 3d


Nutritional Risk Acuity 3:  Nausea


Past Medical History:  


Cholecystectomy, tonsillectomy, migraine, asthma, deression


Nutritional Acuity:  2-Moderate


Nutrition Diagnosis:  Inadequate Food Intake


Nutrition Etiology:  Physiological Causes


Nutrition Problem/Etiology/Sym:  


r/t nausea AEB average intake 23% past 4 days.


Adjusted Energy Requirement Re:  1905


Protein Requirement:  91


Fluid Requirement:  2275


Diet Type:  Diet as Tolerated FELIX/REG


Nutrition Intervention:  Cont diet as ordered, Encourage intake, Between meal 

supplement


Additional Diet Restrictions:  OFFER NUTR SUPPLMENT  IF REFUSE MEAL OR INTAKE < 

50%





Nutrition Monitoring & Eval


RD Patient Assessment Time:  30 minutes


RD Assessment Type:  RD Re-Assessment


Patient Nutrition Acuity:  2-Moderate


Follow Up Date:  Nov 29, 2017


Nutritional Comment:  


Pt admitted with abdominal pain, nausea and migraine HA and will be  


treated in ICU for pyelonephritis. Currently on FELIX with intake of 75% of  


one meal. BMI in obese category. Notable labs include low H/H, elevated  


liver enzymes, CRP 27.3, total pro 4.7 and alb 2.3. Will cont to monitor  


and encourage intake. 





11/22  Intake average 23%.  Refused supper tray today stating r/t nausea.


Alb declined to 2.2 Recommend nutr support if pt cont poor intake.





11/24


Pt intake improving with consuming 75% of two meals yesterday. Please


obtain new wt. Offering nutr suppl if intake below 50%. Notable labs


include elevated liver enzymes, alk phos 240, total pro 6.2 and alb 3.


Will cont to monitor and encourage intake.











SALO JOYNER Nov 24, 2017 10:23

## 2017-11-25 VITALS — DIASTOLIC BLOOD PRESSURE: 73 MMHG | SYSTOLIC BLOOD PRESSURE: 112 MMHG

## 2017-11-25 VITALS — DIASTOLIC BLOOD PRESSURE: 74 MMHG | SYSTOLIC BLOOD PRESSURE: 116 MMHG

## 2017-11-25 VITALS — SYSTOLIC BLOOD PRESSURE: 114 MMHG | DIASTOLIC BLOOD PRESSURE: 68 MMHG

## 2017-11-25 LAB — PLATELET COUNT, AUTOMATED: 321 K/UL (ref 150–450)

## 2017-11-25 RX ADMIN — ENOXAPARIN SODIUM SCH MG: 100 INJECTION SUBCUTANEOUS at 09:52

## 2017-11-25 RX ADMIN — DOCUSATE SODIUM SCH MG: 100 CAPSULE, LIQUID FILLED ORAL at 09:51

## 2017-11-25 RX ADMIN — PANTOPRAZOLE SODIUM SCH MG: 20 TABLET, DELAYED RELEASE ORAL at 09:51

## 2017-11-25 RX ADMIN — AMOXICILLIN AND CLAVULANATE POTASSIUM SCH MG: 875; 125 TABLET, FILM COATED ORAL at 07:43

## 2017-11-25 RX ADMIN — LEVOFLOXACIN SCH MG: 750 TABLET, FILM COATED ORAL at 09:51

## 2017-11-25 RX ADMIN — POTASSIUM CHLORIDE SCH MEQ: 1500 TABLET, EXTENDED RELEASE ORAL at 07:43

## 2017-12-29 NOTE — OPERATIVE REPORT 1
EVENT DATE:  November 18, 2017

SURGEON:  Pop Silver MD

ANESTHESIOLOGIST:  Donny Hernandez MD

ANESTHESIA:  General anesthetic.





PREOPERATIVE DIAGNOSES

1.  Right ureterolithiasis with associated obstruction.

2.  Pyelonephritis.

3.  Urosepsis.



POSTOPERATIVE DIAGNOSES

1.  Right ureterolithiasis with associated obstruction.

2.  Pyelonephritis.

3.  Urosepsis.



PROCEDURES PERFORMED

1.  Cystourethroscopy. 

2.  Manipulation of right ureteral stone.  

3.  Right ureteral stent placement.  



DESCRIPTION OF PROCEDURE

Under general anesthetic, the patient was prepped and draped in the extended 
lithotomy position.  



A 21 panendoscope was admitted through the urethra into the bladder.  



She showed a mild ureteritis, trigonitis, and diffuse acute and chronic 
inflammation.  



The stone in the right ureter was manipulated into the right renal pelvis.  



The 6-Turkmen x 26 cm Percuflex Plus was passed up the right collecting system 
into the right renal pelvis.  



X-ray confirmed satisfactory position.  She had a full curl in the kidney and 
the bladder.  



The bladder was drained.  



The scope was withdrawn.



The patient tolerated the procedure satisfactorily and returned to the recovery 
room in satisfactory, but serious condition. 



This is a 22-year-old [facility] female complaining of right ureterolithiasis, 
right pyelonephritis, and urosepsis.  



Options were discussed with the patient and her mother pretreatment, and she is 
agreeable to evaluation and therapy.



See operative note for details.  



I hope the stent placement and treatment will relieve her difficulty and 
resolve her infection.  



After resolution of the infection over approximately a one-month period, she 
will need shock wave lithotripsy of the stone in the right renal pelvis area.  



She will be discharged home on appropriate antibiotic therapy.  She will be in 
the hospital for a few days with double antibiotics in the intensive care unit.
  The patient is very sick and toxic from her urosepsis.  



She had multiple blood cultures that were positive in less than 12 hours.  



The patient will probably be in the hospital for IV antibiotics for 
approximately three to four days.  



As previously alluded, she will have followup for treatment of her urolithiasis.



As previously alluded, the patient will be discharged home on appropriate 
antibiotic, Pepcid, Motrin, and Vicodin therapy.  She is to continue her usual 
medications.  



 A copy of the instructions were given to the patient.  
SUSAN

## 2018-01-04 ENCOUNTER — HOSPITAL ENCOUNTER (OUTPATIENT)
Dept: HOSPITAL 89 - OR | Age: 23
Discharge: HOME | End: 2018-01-04
Attending: UROLOGY
Payer: SELF-PAY

## 2018-01-04 VITALS
WEIGHT: 188 LBS | BODY MASS INDEX: 34.6 KG/M2 | HEIGHT: 62 IN | HEIGHT: 62 IN | BODY MASS INDEX: 34.6 KG/M2 | WEIGHT: 188 LBS

## 2018-01-04 VITALS — DIASTOLIC BLOOD PRESSURE: 90 MMHG | SYSTOLIC BLOOD PRESSURE: 110 MMHG

## 2018-01-04 VITALS — SYSTOLIC BLOOD PRESSURE: 119 MMHG | DIASTOLIC BLOOD PRESSURE: 71 MMHG

## 2018-01-04 VITALS — DIASTOLIC BLOOD PRESSURE: 73 MMHG | SYSTOLIC BLOOD PRESSURE: 114 MMHG

## 2018-01-04 VITALS — DIASTOLIC BLOOD PRESSURE: 95 MMHG | SYSTOLIC BLOOD PRESSURE: 118 MMHG

## 2018-01-04 VITALS — DIASTOLIC BLOOD PRESSURE: 78 MMHG | SYSTOLIC BLOOD PRESSURE: 109 MMHG

## 2018-01-04 DIAGNOSIS — N20.2: Primary | ICD-10-CM

## 2018-01-04 PROCEDURE — 50590 FRAGMENTING OF KIDNEY STONE: CPT

## 2018-01-04 PROCEDURE — 81025 URINE PREGNANCY TEST: CPT

## 2018-01-04 PROCEDURE — 52352 CYSTOURETERO W/STONE REMOVE: CPT

## 2018-01-04 PROCEDURE — 74018 RADEX ABDOMEN 1 VIEW: CPT

## 2018-01-04 PROCEDURE — 88300 SURGICAL PATH GROSS: CPT

## 2018-01-04 PROCEDURE — 82365 CALCULUS SPECTROSCOPY: CPT

## 2018-01-04 PROCEDURE — 81001 URINALYSIS AUTO W/SCOPE: CPT

## 2018-01-04 PROCEDURE — 76100 X-RAY EXAM OF BODY SECTION: CPT

## 2018-01-04 PROCEDURE — 87088 URINE BACTERIA CULTURE: CPT

## 2018-01-04 NOTE — RADIOLOGY IMAGING REPORT
FACILITY: [facility] 

 

PATIENT NAME: Lissette Lindsey

: 1995

MR: [id number]

V: [id number]

EXAM DATE: 

ORDERING PHYSICIAN: JOHN STEINBERG

TECHNOLOGIST: 

 

Location: [facility]

Patient: Lissette Lindsey

: 1995

MRN: [id number]

Visit/Account:[id number]

Date of Sevice:  2018

 

ACCESSION #: [id number]

 

Exam type: XR ABDOMEN 1 VIEW

 

History: Kidney stones

 

Comparison: CT of the 2017.

 

Findings:

 

There is a right ureteral stent in place.  The proximal portion of the stent projects over the medial
 aspect the right renal shadow.  The distal portion projects just to the right midline lower pelvis. 
 No definite calcifications are seen along the course of the stent nor over the renal shadows.  Bowel
 gas pattern is nonspecific.  Surgical clips are seen in the right upper quadrant of abdomen.

 

IMPRESSION:

 

1.  Right ureteral stent

 

No definite calcifications project along the course of the stent or the renal shadows.

 

Report Dictated By: Corinne Madrigal MD at 2018 8:31 AM

 

Report E-Signed By: Corinne Madrigal MD  at 2018 8:33 AM

 

WSN:[email or web address]

## 2018-01-04 NOTE — FLOCK CYSTOURETHROSCOPY
EVENT DATE: January 4, 2018

SURGEON: AILYN Silver MD 

ANESTHESIOLOGIST: Denzel Angel MD

ANESTHESIA: General





PREOPERATIVE DIAGNOSES

1.  Right ureteral and/or renolithiasis.  

2.  Status post right pyelonephritis.  

3.  Status post urosepsis.  



POSTOPERATIVE DIAGNOSES

1.  Right ureterolithiasis.  

2.  Right renolithiasis.

2.  Status post right pyelonephritis.  

3.  Status post urosepsis.  



PROCEDURES PERFORMED

1.  Cystourethroscopy.  

2.  Removal of right ureteral stent.  

3.  Right ureterorenoscopy.

4.  Extraction of two right ureteral stones.

5.  Right ureteral stent, external, replacement.  

6.  Right extracorporeal shockwave lithotripsy.

7.  Removal of external ureteral stent.



DESCRIPTION OF PROCEDURE

Under general anesthetic, the patient was prepped and draped in the extended 
lithotomy position.  



The ureterorenoscope was admitted through the urethra and into the bladder.  
The ureterorenoscope passed up the right collecting system without any 
difficulty.  



Two small stone fragments, approximately 1-2 mm, were visualized. 



The two stones were extracted.  



Ascending and descending right ureter revealed no other stones or lesions.



The 6 whistle tip passed up the right collecting system and was secured without 
any difficulty.



Patient was transferred to the stone treatment unit.  



After localization on fluoroscopy, a suspected stone in the mid pole area of 
the right kidney was treated with a total of 1600 shocks, progressing from low 
to high kV fairly slowly.  



Contrast was utilized to verify the position and treatment. 



At conclusion of the treatment, the external stent and T[place]well were removed.  



The patient tolerated the procedure satisfactorily and returned to the recovery 
room in satisfactory condition.  



INDICATION FOR PROCEDURE

This is a 22-year-old white female complaining of right ureterolithiasis and/or 
renolithiasis.  



Status post right pyelonephritis and urosepsis.  



Patient recovered from her serious illness following the stent placement in 
November of 2017.  



Options were discussed with the patient and her mother, and she is agreeable to 
further evaluation and therapy.  



That has been accomplished.  See operative note for details.  



DISCHARGE INSTRUCTIONS

The patient will be ready for discharge home when alert and functional, to 
force fluids 12 glasses of water per day.  



Activities are as tolerated.  



She will be discharged on Cipro, Pepcid, Motrin and Norco therapy.  



She is to strain all of her urine. She was sent home with strainer.  



She is to percuss her right kidney frequently to facilitate passage of stone 
particles.  Copy of instructions for renal percussion were given to the 
patient.  



Plan follow up February 6, 2018.  She is to call for an appointment.  

SUSAN

## 2018-01-04 NOTE — RADIOLOGY IMAGING REPORT
FACILITY: [facility] 

 

PATIENT NAME: Lissette Lindsey

: 1995

MR: [id number]

V: [id number]

EXAM DATE: 

ORDERING PHYSICIAN: JOHN STEINBERG

TECHNOLOGIST: 

 

[place] [facility] [place]

Patient: Lissette Lindsey

: 1995

MRN: [id number]

Visit/Account:[id number]

Date of Sevice:  2018

 

ACCESSION #: [id number]

 

Exam type: XR TOMOGRAM

 

History: Kidney stones

 

Comparison: CT 2017.

 

Findings:

 

There is a right ureteral stent in place.  No definite calcifications are identified projecting over 
the renal shadows.  Surgical clips are present right upper quadrant abdomen.  Bowel gas pattern is no
nspecific

 

IMPRESSION:

 

1.  Right ureteral stent

 

No definite calcifications are seen projecting over the renal shadows

 

Report Dictated By: Corinne Madrigal MD at 2018 8:29 AM

 

Report E-Signed By: Corinne Madrigal MD  at 2018 8:31 AM

 

WSN:[email or web address]

## 2018-01-04 NOTE — RADIOLOGY IMAGING REPORT
FACILITY: [facility] [place] 

 

PATIENT NAME: Lissette Lindsey

: 1995

MR: [id number]

V: [id number]

EXAM DATE: 

ORDERING PHYSICIAN: JOHN STEINBERG

TECHNOLOGIST: 

 

Location: [facility] [place]

Patient: Lissette Lindsey

: 1995

MRN: [id number]

Visit/Account:[id number]

Date of Sevice:  2018

 

ACCESSION #: [id number]

 

Exam type: KUB SINGLE VIEW ABDOMEN

 

History: HEMATURIA, URETERAL STONES

 

Comparison: CT 2017.

 

Findings:

 

Is a right ureteral stent in place.  The proximal pigtail is incompletely imaged.  Visualized bowel g
as pattern is nonspecific.  The renal shadows are incompletely imaged.

 

IMPRESSION:

 

1.  Right ureteral stent

 

Report Dictated By: Corinne Madrigal MD at 2018 2:54 PM

 

Report E-Signed By: Corinne Madrigal MD  at 2018 2:56 PM

 

WSN:[email or web address]

## 2018-02-02 ENCOUNTER — HOSPITAL ENCOUNTER (OUTPATIENT)
Dept: HOSPITAL 89 - CT | Age: 23
End: 2018-02-02
Attending: UROLOGY
Payer: SELF-PAY

## 2018-02-02 VITALS — BODY MASS INDEX: 36.94 KG/M2

## 2018-02-02 DIAGNOSIS — N83.201: Primary | ICD-10-CM

## 2018-02-02 DIAGNOSIS — Z90.49: ICD-10-CM

## 2018-02-02 PROCEDURE — 81025 URINE PREGNANCY TEST: CPT

## 2018-02-02 PROCEDURE — 74176 CT ABD & PELVIS W/O CONTRAST: CPT

## 2018-02-02 NOTE — RADIOLOGY IMAGING REPORT
FACILITY: [facility] 

 

PATIENT NAME: Lissette Lindsey

: 1995

MR: [id number]

V: [id number]

EXAM DATE: 

ORDERING PHYSICIAN: JOHN STEINBERG

TECHNOLOGIST: 

 

Location: [facility]

Patient: Lissette Lindsey

: 1995

MRN: [id number]

Visit/Account:[id number]

Date of Sevice:  2018

 

ACCESSION #: [id number]

 

ABDOMEN/PELVIS W/O CONTRAST

 

COMPARISON:  CT urogram 2017

 

HISTORY:  Nephroureterolithiasis.

 

TECHNIQUE:  Axial CT abdomen and pelvis without intravenous contrast.  Coronal and sagittal reformats
.One of the following dose optimization  techniques was utilized in the performance of this exam: aut
omated exposure control; adjustment of the mA and/or kV according to patient size; or use of iterativ
e reconstruction technique.  Specific details can be referenced in the facility's radiology CT exam o
perational policy.

 

CONTRAST:  No intravenous contrast.

 

FINDINGS:

Lack of IV contrast limits assessment of the liver and other solid organs for subtle pathology. Withi
n these limitations, the following observations are made:

LUNG BASES:  Unremarkable.

LIVER:                            Negative.

BILIARY:                         Cholecystectomy clips. No biliary dilatation.

SPLEEN:  Negative.

PANCREAS:  Negative for noncontrast CT technique.

ADRENALS:  Negative.

KIDNEYS:  No stones, asymmetric fat stranding, hydronephrosis, or contour deforming renal mass. The r
ight ureteral stent has been removed.

GI/MESENTERY:  No visible mass, obstruction, or bowel wall thickening. Normal appendix.

VASCULAR:  Unremarkable.

LYMPH NODES:  Negative.

BLADDER:  No bladder wall thickening, appreciable mass within the limitations of a noncontrast CT and
 no bladder calculi. No perivesical fluid or fat stranding.

PELVIC ORGANS:  Water density lesion, right ovary measuring 2.4 cm, probably a simple cyst. Normal no
ncontrast CT appearance of the uterus and left ovary.

BONES:  Negative.

 

OTHER:  Negative.

 

 

 

IMPRESSION:

1.  No evidence of  system calculus or obstruction. The right ureteral stent has been removed since
 the previous exam.

2.  Previous cholecystectomy.

3.  2.4 cm presumed cyst in the right ovary.

 

 

Report Dictated By: Brendan Gray at 2018 5:11 PM

 

Report E-Signed By: Brendan Gray  at 2018 5:27 PM

 

WSN:[id number]

## 2018-05-14 ENCOUNTER — HOSPITAL ENCOUNTER (EMERGENCY)
Dept: HOSPITAL 89 - ER | Age: 23
LOS: 1 days | Discharge: HOME | End: 2018-05-15
Payer: COMMERCIAL

## 2018-05-14 VITALS — WEIGHT: 190 LBS | BODY MASS INDEX: 36.94 KG/M2

## 2018-05-14 DIAGNOSIS — M54.9: Primary | ICD-10-CM

## 2018-05-14 LAB — PLATELET COUNT, AUTOMATED: 292 K/UL (ref 150–450)

## 2018-05-14 PROCEDURE — 87088 URINE BACTERIA CULTURE: CPT

## 2018-05-14 PROCEDURE — 96361 HYDRATE IV INFUSION ADD-ON: CPT

## 2018-05-14 PROCEDURE — 83690 ASSAY OF LIPASE: CPT

## 2018-05-14 PROCEDURE — 96375 TX/PRO/DX INJ NEW DRUG ADDON: CPT

## 2018-05-14 PROCEDURE — 82150 ASSAY OF AMYLASE: CPT

## 2018-05-14 PROCEDURE — 82565 ASSAY OF CREATININE: CPT

## 2018-05-14 PROCEDURE — 74177 CT ABD & PELVIS W/CONTRAST: CPT

## 2018-05-14 PROCEDURE — 81001 URINALYSIS AUTO W/SCOPE: CPT

## 2018-05-14 PROCEDURE — 84132 ASSAY OF SERUM POTASSIUM: CPT

## 2018-05-14 PROCEDURE — 84460 ALANINE AMINO (ALT) (SGPT): CPT

## 2018-05-14 PROCEDURE — 83605 ASSAY OF LACTIC ACID: CPT

## 2018-05-14 PROCEDURE — 82040 ASSAY OF SERUM ALBUMIN: CPT

## 2018-05-14 PROCEDURE — 82247 BILIRUBIN TOTAL: CPT

## 2018-05-14 PROCEDURE — 84520 ASSAY OF UREA NITROGEN: CPT

## 2018-05-14 PROCEDURE — 99284 EMERGENCY DEPT VISIT MOD MDM: CPT

## 2018-05-14 PROCEDURE — 81025 URINE PREGNANCY TEST: CPT

## 2018-05-14 PROCEDURE — 96374 THER/PROPH/DIAG INJ IV PUSH: CPT

## 2018-05-14 PROCEDURE — 84155 ASSAY OF PROTEIN SERUM: CPT

## 2018-05-14 PROCEDURE — 82374 ASSAY BLOOD CARBON DIOXIDE: CPT

## 2018-05-14 PROCEDURE — 82310 ASSAY OF CALCIUM: CPT

## 2018-05-14 PROCEDURE — 82947 ASSAY GLUCOSE BLOOD QUANT: CPT

## 2018-05-14 PROCEDURE — 85025 COMPLETE CBC W/AUTO DIFF WBC: CPT

## 2018-05-14 PROCEDURE — 82435 ASSAY OF BLOOD CHLORIDE: CPT

## 2018-05-14 PROCEDURE — 84450 TRANSFERASE (AST) (SGOT): CPT

## 2018-05-14 PROCEDURE — 84075 ASSAY ALKALINE PHOSPHATASE: CPT

## 2018-05-14 PROCEDURE — 76830 TRANSVAGINAL US NON-OB: CPT

## 2018-05-14 PROCEDURE — 84295 ASSAY OF SERUM SODIUM: CPT

## 2018-05-14 NOTE — RADIOLOGY IMAGING REPORT
FACILITY: [facility] 

 

PATIENT NAME: Lissette Lindsey

: 1995

MR: [id number]

V: [id number]

EXAM DATE: 

ORDERING PHYSICIAN: ELISE UYN

TECHNOLOGIST: 

 

Location: 

Patient: Lissette Lindsey

: 1995

MRN: [id number]

Visit/Account:[id number]

Date of Sevice:  2018

 

ACCESSION #: [id number]

 

ABDOMEN/PELVIS WITH CONTRAST

 

HISTORY: Right-sided back pain. History of renal stones.

 

COMPARISON: 2018 and studies dating to 2012.

 

TECHNIQUE: Axial images were obtained from the lung bases through the symphysis pubis with intravenou
s contrast. Sagittal and coronal reformats were performed.

 

One of the following dose optimization techniques was utilized in the performance of this exam: Autom
ated exposure control; adjustment of the mA and/or kV according to the patient's size; or use of an i
terative reconstruction technique. Specific details can be referenced in the facility's radiology CT 
exam operational policy.

 

CONTRAST: 75 mL IV Isovue-370.

 

 

FINDINGS:

 

Lower chest: There is minimal atelectasis.

 

Liver: The superiormost liver is excluded. The visible liver is normal.

 

Gallbladder/biliary: Status post cholecystectomy. No intrahepatic or extrahepatic ductal dilation.

 

Pancreas: Normal.

 

Spleen: Normal.

 

Adrenals: Normal.

 

Kidneys/ureters/bladder: Normal.

 

GI/mesentery/peritoneal cavity: There is no bowel obstruction. There is no wall thickening or pericol
onic stranding. The appendix is normal.

 

Vessels: No aneurysm or significant atherosclerotic disease. No dissection.

 

Nodes: Normal.

 

Pelvis: Uterus and ovaries are normal. There is a 2.5 cm right ovarian cyst (image 110 series 2). No 
free fluid. There is a right pelvic phlebolith.

 

Bones/vertebra/soft tissues: Normal.

 

IMPRESSION:

1. No hydronephrosis and no obstructing or nonobstructing renal or ureteral calculus.

2. Normal appendix.

3. 2.5 cm right ovarian cyst.

 

Report Dictated By: Angélica Cabrera at 2018 11:46 PM

 

Report E-Signed By: Angélica Cabrera  at 2018 11:51 PM

 

WSN:[id number]

## 2018-05-14 NOTE — ER REPORT
History and Physical


Time Seen By MD:  22:23


Hx. of Stated Complaint:  


patient has had right flank pain since yestereday, patient having nausea and 


fever as well. patient states pain got really bad today. patient has hx of 


kidney stone with pyelonephritis.


HPI/ROS


CHIEF COMPLAINT: back pain and fever





HISTORY OF PRESENT ILLNESS: This is a 22 year old female. She has had pain in 

her right lower back since Saturday. She is having fevers. She is worried about 

a kidney stone or an infection. She had a pyelonephritis and stone requiring a 

stent last November. She has had mild pain for a few days, but significant 

worsening tonight. Having associated nausea and vomiting. She has not been 

having much abdominal pain with this. Normal bowels. Having some frequency, no 

dysuria.





REVIEW OF SYSTEMS:


Constitutional: As above.


Eyes: No vision changes.


ENT: No sore throat. No congestion.


Cardiovascular: No chest pain. 


Respiratory: No cough. No shortness of breath.


Gastrointestinal: As above.


Genitourinary: As above.


Musculoskeletal: No extremity pain.


Skin: No rashes.


Neurological: No numbness. Feeling a little lightheaded.


Allergies:  


Coded Allergies:  


     No Known Allergies (Verified  Allergy, Mild, 5/14/18)


Home Meds


Active Scripts


Promethazine Hcl (PROMETHAZINE HCL) 25 Mg Tablet, 25 MG PO Q8H Y for NAUSEA/

VOMITING, #20 TAB 0 Refills


   Prov:ELISE YUN MD         5/15/18


Sulfamethoxazole/Trimet 800-160 Mg Tab (BACTRIM DS TABLET) 1 Each Tablet, 1 TAB 

PO Q12H for 5 Days, #10 TAB 0 Refills


   Prov:ELISE YUN MD         5/15/18


Cyclobenzaprine Hcl (CYCLOBENZAPRINE HCL) 10 Mg Tablet, 10 MG PO Q8H Y for 

MUSCLE SPASMS, #20 TAB 0 Refills


   Prov:ELISE YUN MD         5/15/18


Discontinued Reported Medications


Acetaminophen/Hydrocodone (HYDROCODON-ACETAMINOPH 7.5-325) 1 Each Ea, 1 EACH PO 

Q4-6H Y for PAIN, #30 EA


   1/4/18


Famotidine (PEPCID) 20 Mg Tablet, 20 MG PO BID, #20 TAB


   1/4/18


Ciprofloxacin Hcl (CIPROFLOXACIN HCL) 500 Mg Tablet, 500 MG PO BID, #15 TAB


   1/4/18


Ciprofloxacin Hcl (CIPROFLOXACIN HCL) 500 Mg Tablet, 500 MG PO Q12H, #14 TAB


   12/28/17


Ibuprofen (MOTRIN IB) 200 Mg Tablet, 3 TAB PO TID Y for PAIN


   11/25/17


Etonogestrel (NEXPLANON) 68 Mg Implant, 68 MG SQ AS DIRECTED, IMPLANT


   11/21/17


Albuterol Sulfate 90 Mcg/Act (PROAIR HFA 90 MCG/ACT) 8.5 Gm Hfa.aer.ad, 2 PUFF 

IH Q4-6H, INHALER


   11/17/17


Past Medical/Surgical History


cholecystectomy, kidney stone with ureteral stent, tonsillectomy


Reviewed Nurses Notes:  Yes


Hx Smoking:  No


Smoking Status:  Never Smoker


Exposure to Second Hand Smoke?:  Yes


Hx Substance Use Disorder:  No


Hx Alcohol Use:  No


Constitutional





Vital Sign - Last 24 Hours








 5/14/18 5/14/18 5/14/18 5/14/18





 22:18 22:19 22:30 23:00


 


Temp 100.1   


 


Pulse 106   


 


Resp 24   


 


B/P (MAP) 137/81 137/81 (99) 132/82 (99) 119/75 (90)


 


Pulse Ox 98   


 


O2 Delivery Room Air   


 


    





 5/14/18 5/14/18 5/15/18 5/15/18





 23:31 23:57 00:00 00:05


 


Pulse  107  107


 


B/P (MAP) 113/68 (83)  112/70 (84) 


 


Pulse Ox    98





 5/15/18 5/15/18 5/15/18 5/15/18





 00:20 00:30 00:35 00:50


 


Pulse 115  113 ???


 


B/P (MAP)  114/51 (72)  


 


Pulse Ox 96  95 97





 5/15/18 5/15/18 5/15/18 5/15/18





 01:00 01:05 01:30 01:50


 


Pulse  110  ???


 


B/P (MAP) 108/75 (86)  122/82 (95) 


 


Pulse Ox  96  100





 5/15/18   





 02:35   


 


B/P (MAP) 116/66 (83)   








Physical Exam


   General Appearance: The patient is alert. Having acute distress due to pain.


Eyes: Pupils are equal, round. Reactive to light. No pallor or icterus, some 

injection. Extraocular movements are intact.


ENT: Mucous membranes are moist. Normal oral mucosa. Posterior oropharynx is 

normal.


Neck: Supple and non tender. No lymphadenopathy.


Respiratory: Breathing easily and unlabored. Lungs are clear to auscultation.


Cardiovascular: Regular rate and rhythm. No murmurs, gallops or rubs. Normal 

capillary refill.


Gastrointestinal: Abdomen is soft and non tender. Nondistended. No rebound or 

guarding. Normal active bowel sounds. No costovertebral angle tenderness with 

percussion. Pain is more in the lower right lumbar area and around to the side.


Neurological: Alert and oriented


Skin: Warm and dry. No rashes.


Musculoskeletal: Extremities are nontender. No tenderness in palpation of the 

cervical, thoracic and lumbar spine.





DIFFERENTIAL DIAGNOSIS: After history and physical exam, differential diagnosis 

was considered for fever and low back and right side pain including but not 

limited to appendicitis, colitis, kidney stones and urinary tract infection.





Medical Decision Making


Data Points


Result Diagram:  


5/14/18 2229 5/14/18 2229





Laboratory





Hematology








Test


  5/14/18


22:16 5/14/18


22:29


 


Urine Color Straw  


 


Urine Clarity Clear  


 


Urine pH


  8.0 pH


(4.8-9.5) 


 


 


Urine Specific Gravity 1.005  


 


Urine Protein


  Negative mg/dL


(NEGATIVE) 


 


 


Urine Glucose (UA)


  Negative mg/dL


(NEGATIVE) 


 


 


Urine Ketones


  Negative mg/dL


(NEGATIVE) 


 


 


Urine Blood


  Small


(NEGATIVE) 


 


 


Urine Nitrite


  Negative


(NEGATIVE) 


 


 


Urine Bilirubin


  Negative


(NEGATIVE) 


 


 


Urine Urobilinogen


  Negative mg/dL


(0.2-1.9) 


 


 


Urine Leukocyte Esterase


  Negative


(NEGATIVE) 


 


 


Urine RBC


  None /HPF


(0-2/HPF) 


 


 


Urine WBC


  1 /HPF


(0-5/HPF) 


 


 


Urine Squamous Epithelial


Cells Moderate /LPF


(</=FEW) 


 


 


Urine Bacteria


  Few /HPF


(NONE-FEW) 


 


 


Urine Mucus


  None /HPF


(NONE-FEW) 


 


 


Urine HCG, Qualitative


  Negative


(NEGATIVE) 


 


 


Red Blood Count


  


  5.20 M/uL


(4.17-5.56)


 


Mean Corpuscular Volume


  


  87.0 fL


(80.0-96.0)


 


Mean Corpuscular Hemoglobin


  


  31.0 pg


(26.0-33.0)


 


Mean Corpuscular Hemoglobin


Concent 


  35.6 g/dL


(32.0-36.0)


 


Red Cell Distribution Width


  


  12.7 %


(11.5-14.5)


 


Mean Platelet Volume


  


  9.7 fL


(7.2-11.1)


 


Neutrophils (%) (Auto)


  


  75.1 %


(39.4-72.5)


 


Lymphocytes (%) (Auto)


  


  15.8 %


(17.6-49.6)


 


Monocytes (%) (Auto)


  


  7.1 %


(4.1-12.4)


 


Eosinophils (%) (Auto)


  


  1.1 %


(0.4-6.7)


 


Basophils (%) (Auto)


  


  0.9 %


(0.3-1.4)


 


Nucleated RBC Relative Count


(auto) 


  0.0 /100WBC 


 


 


Neutrophils # (Auto)


  


  12.4 K/uL


(2.0-7.4)


 


Lymphocytes # (Auto)


  


  2.6 K/uL


(1.3-3.6)


 


Monocytes # (Auto)


  


  1.2 K/uL


(0.3-1.0)


 


Eosinophils # (Auto)


  


  0.2 K/uL


(0.0-0.5)


 


Basophils # (Auto)


  


  0.1 K/uL


(0.0-0.1)


 


Nucleated RBC Absolute Count


(auto) 


  0.01 K/uL 


 


 


Sodium Level


  


  140 mmol/L


(137-145)


 


Potassium Level


  


  3.7 mmol/L


(3.5-5.0)


 


Chloride Level


  


  101 mmol/L


()


 


Carbon Dioxide Level


  


  26 mmol/L


(22-31)


 


Blood Urea Nitrogen  9 mg/dl (7-18) 


 


Creatinine


  


  0.80 mg/dl


(0.52-1.04)


 


Glomerular Filtration Rate


Calc 


  > 60.0 


 


 


Random Glucose


  


  90 mg/dl


()


 


Lactate


  


  1.3 mmol/L


(0.7-2.1)


 


Calcium Level


  


  9.8 mg/dl


(8.4-10.2)


 


Total Bilirubin


  


  0.6 mg/dl


(0.2-1.3)


 


Aspartate Amino Transf


(AST/SGOT) 


  15 U/L (0-35) 


 


 


Alanine Aminotransferase


(ALT/SGPT) 


  19 U/L (0-56) 


 


 


Alkaline Phosphatase


  


  122 U/L


(0-126)


 


Total Protein


  


  8.3 gm/dl


(6.3-8.2)


 


Albumin


  


  4.5 g/dl


(3.5-5.0)


 


Amylase Level  76 U/L (0-110) 


 


Lipase


  


  85 U/L


()








Chemistry








Test


  5/14/18


22:16 5/14/18


22:29


 


Urine Color Straw  


 


Urine Clarity Clear  


 


Urine pH


  8.0 pH


(4.8-9.5) 


 


 


Urine Specific Gravity 1.005  


 


Urine Protein


  Negative mg/dL


(NEGATIVE) 


 


 


Urine Glucose (UA)


  Negative mg/dL


(NEGATIVE) 


 


 


Urine Ketones


  Negative mg/dL


(NEGATIVE) 


 


 


Urine Blood


  Small


(NEGATIVE) 


 


 


Urine Nitrite


  Negative


(NEGATIVE) 


 


 


Urine Bilirubin


  Negative


(NEGATIVE) 


 


 


Urine Urobilinogen


  Negative mg/dL


(0.2-1.9) 


 


 


Urine Leukocyte Esterase


  Negative


(NEGATIVE) 


 


 


Urine RBC


  None /HPF


(0-2/HPF) 


 


 


Urine WBC


  1 /HPF


(0-5/HPF) 


 


 


Urine Squamous Epithelial


Cells Moderate /LPF


(</=FEW) 


 


 


Urine Bacteria


  Few /HPF


(NONE-FEW) 


 


 


Urine Mucus


  None /HPF


(NONE-FEW) 


 


 


Urine HCG, Qualitative


  Negative


(NEGATIVE) 


 


 


White Blood Count


  


  16.6 k/uL


(4.5-11.0)


 


Red Blood Count


  


  5.20 M/uL


(4.17-5.56)


 


Hemoglobin


  


  16.1 g/dL


(12.0-16.0)


 


Hematocrit


  


  45.3 %


(34.0-47.0)


 


Mean Corpuscular Volume


  


  87.0 fL


(80.0-96.0)


 


Mean Corpuscular Hemoglobin


  


  31.0 pg


(26.0-33.0)


 


Mean Corpuscular Hemoglobin


Concent 


  35.6 g/dL


(32.0-36.0)


 


Red Cell Distribution Width


  


  12.7 %


(11.5-14.5)


 


Platelet Count


  


  292 K/uL


(150-450)


 


Mean Platelet Volume


  


  9.7 fL


(7.2-11.1)


 


Neutrophils (%) (Auto)


  


  75.1 %


(39.4-72.5)


 


Lymphocytes (%) (Auto)


  


  15.8 %


(17.6-49.6)


 


Monocytes (%) (Auto)


  


  7.1 %


(4.1-12.4)


 


Eosinophils (%) (Auto)


  


  1.1 %


(0.4-6.7)


 


Basophils (%) (Auto)


  


  0.9 %


(0.3-1.4)


 


Nucleated RBC Relative Count


(auto) 


  0.0 /100WBC 


 


 


Neutrophils # (Auto)


  


  12.4 K/uL


(2.0-7.4)


 


Lymphocytes # (Auto)


  


  2.6 K/uL


(1.3-3.6)


 


Monocytes # (Auto)


  


  1.2 K/uL


(0.3-1.0)


 


Eosinophils # (Auto)


  


  0.2 K/uL


(0.0-0.5)


 


Basophils # (Auto)


  


  0.1 K/uL


(0.0-0.1)


 


Nucleated RBC Absolute Count


(auto) 


  0.01 K/uL 


 


 


Glomerular Filtration Rate


Calc 


  > 60.0 


 


 


Lactate


  


  1.3 mmol/L


(0.7-2.1)


 


Calcium Level


  


  9.8 mg/dl


(8.4-10.2)


 


Total Bilirubin


  


  0.6 mg/dl


(0.2-1.3)


 


Aspartate Amino Transf


(AST/SGOT) 


  15 U/L (0-35) 


 


 


Alanine Aminotransferase


(ALT/SGPT) 


  19 U/L (0-56) 


 


 


Alkaline Phosphatase


  


  122 U/L


(0-126)


 


Total Protein


  


  8.3 gm/dl


(6.3-8.2)


 


Albumin


  


  4.5 g/dl


(3.5-5.0)


 


Amylase Level  76 U/L (0-110) 


 


Lipase


  


  85 U/L


()








Urinalysis








Test


  5/14/18


22:16


 


Urine Color Straw 


 


Urine Clarity Clear 


 


Urine pH


  8.0 pH


(4.8-9.5)


 


Urine Specific Gravity 1.005 


 


Urine Protein


  Negative mg/dL


(NEGATIVE)


 


Urine Glucose (UA)


  Negative mg/dL


(NEGATIVE)


 


Urine Ketones


  Negative mg/dL


(NEGATIVE)


 


Urine Blood


  Small


(NEGATIVE)


 


Urine Nitrite


  Negative


(NEGATIVE)


 


Urine Bilirubin


  Negative


(NEGATIVE)


 


Urine Urobilinogen


  Negative mg/dL


(0.2-1.9)


 


Urine Leukocyte Esterase


  Negative


(NEGATIVE)


 


Urine RBC


  None /HPF


(0-2/HPF)


 


Urine WBC


  1 /HPF


(0-5/HPF)


 


Urine Squamous Epithelial


Cells Moderate /LPF


(</=FEW)


 


Urine Bacteria


  Few /HPF


(NONE-FEW)


 


Urine Mucus


  None /HPF


(NONE-FEW)


 


Urine HCG, Qualitative


  Negative


(NEGATIVE)











EKG/Imaging


Imaging


ABDOMEN/PELVIS WITH CONTRAST


 


HISTORY: Right-sided back pain. History of renal stones.


 


COMPARISON: 2/2/2018 and studies dating to 5/22/2012.


 


TECHNIQUE: Axial images were obtained from the lung bases through the symphysis 

pubis with intravenous contrast. Sagittal and coronal reformats were performed.


 


One of the following dose optimization techniques was utilized in the 

performance of this exam: Automated exposure control; adjustment of the mA and/

or kV according to the patient's size; or use of an iterative reconstruction 

technique. Specific details can be referenced in the facility's radiology CT 

exam operational policy.


 


CONTRAST: 75 mL IV Isovue-370.


 


FINDINGS:


 


Lower chest: There is minimal atelectasis.


 


Liver: The superiormost liver is excluded. The visible liver is normal.


 


Gallbladder/biliary: Status post cholecystectomy. No intrahepatic or 

extrahepatic ductal dilation.


 


Pancreas: Normal.


 


Spleen: Normal.


 


Adrenals: Normal.


 


Kidneys/ureters/bladder: Normal.


 


GI/mesentery/peritoneal cavity: There is no bowel obstruction. There is no wall 

thickening or pericolonic stranding. The appendix is normal.


 


Vessels: No aneurysm or significant atherosclerotic disease. No dissection.


 


Nodes: Normal.


 


Pelvis: Uterus and ovaries are normal. There is a 2.5 cm right ovarian cyst (

image 110 series 2). No free fluid. There is a right pelvic phlebolith.


 


Bones/vertebra/soft tissues: Normal.


 


IMPRESSION:


1. No hydronephrosis and no obstructing or nonobstructing renal or ureteral 

calculus.


2. Normal appendix.


3. 2.5 cm right ovarian cyst.


 


Report Dictated By: Angélica Cabrera at 5/14/2018 11:46 PM








Ultrasound of the pelvis:


 


Indication: Right-sided pain.


Technique: Transvaginal imaging, with Doppler.


Comparison: CT scan from 5/14/2018.


 


Uterus: Normal in size, shape, and echogenicity, measuring 6.5 x 4.1 x 3.3 cm. 

There is no evidence of mass, calcification, or fluid. The endometrial stripe 

measures 3 mm.


 


Right ovary/adnexa: The right ovary measures 3.2 x 2.7 x 2 0 cm. Multiple small 

follicles are present, measuring up to 1.1 cm. There is no evidence of dominant 

cyst or solid mass. Doppler images demonstrate no evidence of torsion. No 

adnexal mass or fluid collection is identified.


 


Left ovary/adnexa: The left ovary measures 2.4 x 2.4 x 2.2 cm. Small follicles 

are present, measuring up to 1.3 cm. There is no evidence of solid mass. 

Doppler images demonstrate no evidence of torsion. No adnexal mass or fluid 

collection is identified.


 


Free fluid: None seen.


 


IMPRESSION: Unremarkable study.


 


Report Dictated By: Tyler Sen MD at 5/15/2018 2:04 AM





ED Course/Re-evaluation


Clinical Indication for ER IV:  Hydration, IV Access


ED Course


Initial evaluation did not really reveal any abdominal pain, mainly pain in the 

right lumbar area of the lower back. Some radiation to the side. Urinalysis 

showed one white cell, no red cells, moderate squamous epithelial cells and few 

bacteria, suggesting more contamination than any sign of infection. Her CBC did 

show an elevated white blood cell count with slight shift in differential. She 

had a normal metabolic panel. CT scan was done and did not show any acute 

pathology, specifically not showing signs of kidney stone or urinary tract 

infection. The appendix was visualized and was normal. There was a small cyst 

on the right ovary. 





The patient was still having pain despite receiving 2mg of Morphine IV as well 

as Zofran 4mg IV and a liter of normal saline. Discussed results of the labs 

and CT with the patient and her mother. 





Pelvic ultrasound was done to rule out Torsion or other adnexal problem on the 

right and this was negative. Norflex was given with the thought that the pain 

was more musculoskeletal and this did a good job of relieving the pain. She 

also had some Phenergan for nausea.





This does not appear to be shingles with the absence of a rash, but could be a 

rare case of one without the rash. Other viral syndrome is also possible cause.





Given her history, we are going to start Bactrim DS, culture the urine, and 

give Flexeril and Phenergan for home use in addition to Ibuprofen and Tylenol. 

Discussed what to watch for and reasons to return to the ER for further 

evaluation.


Decision to Disposition Date:  May 15, 2018


Decision to Disposition Time:  02:23





Depart


Departure


Latest Vital Signs





Vital Signs








  Date Time  Temp Pulse Resp B/P (MAP) Pulse Ox O2 Delivery O2 Flow Rate FiO2


 


5/15/18 02:35    116/66 (83)    


 


5/15/18 01:50  ???   100   


 


5/14/18 22:18 100.1  24   Room Air  








Core Temperature (Celsius):  39.56


Impression:  


 Primary Impression:  


 Acute flank pain


Condition:  Improved


Disposition:  HOME OR SELF-CARE


Referrals:  


RODERICK PABON (PCP)


New Scripts


Promethazine Hcl (PROMETHAZINE HCL) 25 Mg Tablet


25 MG PO Q8H Y for NAUSEA/VOMITING, #20 TAB 0 Refills


   Prov: ELISE YUN MD         5/15/18 


Sulfamethoxazole/Trimet 800-160 Mg Tab (BACTRIM DS TABLET) 1 Each Tablet


1 TAB PO Q12H for 5 Days, #10 TAB 0 Refills


   Prov: ELISE YUN MD         5/15/18 


Cyclobenzaprine Hcl (CYCLOBENZAPRINE HCL) 10 Mg Tablet


10 MG PO Q8H Y for MUSCLE SPASMS, #20 TAB 0 Refills


   Prov: ELISE YUN MD         5/15/18


Patient Instructions:  Flank Pain (ED)





Additional Instructions:  


We do not know what is causing your pain.


We are concerned about the possibility of an infection in the urinary tract 

based on your presentation and your history of problems with this in the past.


There is no sign of infection on the urinalysis or on the CT scan, however we 

are concerned because of the white blood cell count being elevated on your 

complete blood count.


We are going to have your urine test cultured to help determine if there is an 

infection, and in the meantime, treat with the antibiotic Bactrim DS which you 

will take twice a day for 5 days.


The muscle relaxer we gave you seemed to help the most, so the pain could be 

musculoskeletal in origin as well.


Take Ibuprofen or Tylenol as needed for pain.


Take Flexeril 10mg, one every 8 hours as needed for pain.


Take Phenergan 25mg, one every 8 hours as needed for nausea.


Return as needed for worsening symptoms of pain, fevers/chills, nausea/vomiting.


Please make an appointment with your primary care provider for follow-up this 

week.











ELISE YUN MD May 14, 2018 22:23

## 2018-05-15 VITALS — DIASTOLIC BLOOD PRESSURE: 66 MMHG | SYSTOLIC BLOOD PRESSURE: 116 MMHG

## 2018-05-15 NOTE — RADIOLOGY IMAGING REPORT
FACILITY: [facility] [place] 

 

PATIENT NAME: Lissette Lindsey

: 1995

MR: [id number]

V: [id number]

EXAM DATE: 

ORDERING PHYSICIAN: ELISE YUN

TECHNOLOGIST: 

 

Location: [facility]

Patient: Lissette Lindsey

: 1995

MRN: [id number]

Visit/Account:[id number]

Date of Sevice:  5/15/2018

 

ACCESSION #: [id number]

 

Ultrasound of the pelvis:

 

Indication: Right-sided pain.

Technique: Transvaginal imaging, with Doppler.

Comparison: CT scan from 2018.

 

Uterus: Normal in size, shape, and echogenicity, measuring 6.5 x 4.1 x 3.3 cm. There is no evidence o
f mass, calcification, or fluid. The endometrial stripe measures 3 mm.

 

Right ovary/adnexa: The right ovary measures 3.2 x 2.7 x 2 0 cm. Multiple small follicles are present
, measuring up to 1.1 cm. There is no evidence of dominant cyst or solid mass. Doppler images demonst
rate no evidence of torsion. No adnexal mass or fluid collection is identified.

 

Left ovary/adnexa: The left ovary measures 2.4 x 2.4 x 2.2 cm. Small follicles are present, measuring
 up to 1.3 cm. There is no evidence of solid mass. Doppler images demonstrate no evidence of torsion.
 No adnexal mass or fluid collection is identified.

 

Free fluid: None seen.

 

IMPRESSION: Unremarkable study.

 

Report Dictated By: Tyler Sen MD at 5/15/2018 2:04 AM

 

Report E-Signed By: Tyler Sen MD  at 5/15/2018 2:10 AM

 

WSN:[id number]

## 2018-08-20 ENCOUNTER — HOSPITAL ENCOUNTER (OUTPATIENT)
Dept: HOSPITAL 89 - US | Age: 23
End: 2018-08-20
Attending: PHYSICIAN ASSISTANT
Payer: COMMERCIAL

## 2018-08-20 VITALS — BODY MASS INDEX: 36.94 KG/M2

## 2018-08-20 DIAGNOSIS — N13.30: Primary | ICD-10-CM

## 2018-08-20 PROCEDURE — 76705 ECHO EXAM OF ABDOMEN: CPT

## 2018-08-20 NOTE — RADIOLOGY IMAGING REPORT
FACILITY: [facility] - [place] 

 

PATIENT NAME: Lissette Lindsey

: 1995

MR: [id number]

V: [id number]

EXAM DATE: 

ORDERING PHYSICIAN: DAKSHA RAMÍREZ

TECHNOLOGIST: 

 

Location: [facility] - [place]

Patient: Lissette Lindsey

: 1995

MRN: [id number]

Visit/Account:[id number]

Date of Sevice:  2018

 

ACCESSION #: [id number]

 

KIDNEYS

 

EXAMINATION:   Renal ultrasound.

 

History: UTIs pyelonephritis back pain

 

COMPARISON STUDIES:

 

FINDINGS:

Kidneys:

Right kidney- 12.4 x 5.3 x 6 cm

Left kidney- 11.3 x 5.3 x 5.1 cm

Uniform and symmetric blood flow in each kidney by Doppler ultrasound.

Hydronephrosis: Mild on the right

Resistive index on the right 0.64 and on the left 0.69

Bladder: Prevoid volume 252 mL.  Post void residual 11 mL.  Bilateral ureteral jets are present.

 

Abdominal aorta and IVC: Aorta and IVC are patent by Doppler ultrasound.

 

IMPRESSION:

Mild right-sided hydronephrosis

 

Report Dictated By: Corinne Madrigal MD at 2018 3:24 PM

 

Report E-Signed By: Corinne Madrigal MD  at 2018 3:26 PM

 

WSN:[facility]

## 2018-09-12 ENCOUNTER — HOSPITAL ENCOUNTER (OUTPATIENT)
Dept: HOSPITAL 89 - SPU | Age: 23
End: 2018-09-12
Attending: UROLOGY
Payer: COMMERCIAL

## 2018-09-12 VITALS — BODY MASS INDEX: 36.94 KG/M2

## 2018-09-12 DIAGNOSIS — N30.01: Primary | ICD-10-CM

## 2018-09-12 PROCEDURE — 51701 INSERT BLADDER CATHETER: CPT

## 2018-09-12 PROCEDURE — 81001 URINALYSIS AUTO W/SCOPE: CPT

## 2018-09-12 PROCEDURE — 87088 URINE BACTERIA CULTURE: CPT

## 2018-09-17 ENCOUNTER — HOSPITAL ENCOUNTER (OUTPATIENT)
Dept: HOSPITAL 89 - LAB | Age: 23
End: 2018-09-17
Attending: UROLOGY
Payer: COMMERCIAL

## 2018-09-17 VITALS — BODY MASS INDEX: 36.94 KG/M2

## 2018-09-17 DIAGNOSIS — D72.89: Primary | ICD-10-CM

## 2018-09-17 LAB — PLATELET COUNT, AUTOMATED: 349 K/UL (ref 150–450)

## 2018-09-17 PROCEDURE — 85027 COMPLETE CBC AUTOMATED: CPT

## 2018-09-17 PROCEDURE — 36415 COLL VENOUS BLD VENIPUNCTURE: CPT

## 2018-09-27 ENCOUNTER — HOSPITAL ENCOUNTER (OUTPATIENT)
Dept: HOSPITAL 89 - OR | Age: 23
Discharge: HOME | End: 2018-09-27
Attending: UROLOGY
Payer: COMMERCIAL

## 2018-09-27 VITALS — SYSTOLIC BLOOD PRESSURE: 102 MMHG | DIASTOLIC BLOOD PRESSURE: 70 MMHG

## 2018-09-27 VITALS
WEIGHT: 203 LBS | HEIGHT: 62 IN | BODY MASS INDEX: 37.36 KG/M2 | HEIGHT: 62 IN | WEIGHT: 203 LBS | BODY MASS INDEX: 37.36 KG/M2

## 2018-09-27 VITALS — SYSTOLIC BLOOD PRESSURE: 110 MMHG | DIASTOLIC BLOOD PRESSURE: 71 MMHG

## 2018-09-27 VITALS — DIASTOLIC BLOOD PRESSURE: 66 MMHG | SYSTOLIC BLOOD PRESSURE: 110 MMHG

## 2018-09-27 VITALS — SYSTOLIC BLOOD PRESSURE: 109 MMHG | DIASTOLIC BLOOD PRESSURE: 63 MMHG

## 2018-09-27 DIAGNOSIS — R31.9: Primary | ICD-10-CM

## 2018-09-27 DIAGNOSIS — N30.31: ICD-10-CM

## 2018-09-27 DIAGNOSIS — D41.4: ICD-10-CM

## 2018-09-27 DIAGNOSIS — R39.89: ICD-10-CM

## 2018-09-27 PROCEDURE — 86606 ASPERGILLUS ANTIBODY: CPT

## 2018-09-27 PROCEDURE — 81001 URINALYSIS AUTO W/SCOPE: CPT

## 2018-09-27 PROCEDURE — 86698 HISTOPLASMA ANTIBODY: CPT

## 2018-09-27 PROCEDURE — 87088 URINE BACTERIA CULTURE: CPT

## 2018-09-27 PROCEDURE — 86635 COCCIDIOIDES ANTIBODY: CPT

## 2018-09-27 PROCEDURE — 74420 UROGRAPHY RTRGR +-KUB: CPT

## 2018-09-27 PROCEDURE — 36415 COLL VENOUS BLD VENIPUNCTURE: CPT

## 2018-09-27 PROCEDURE — 52224 CYSTOSCOPY AND TREATMENT: CPT

## 2018-09-27 PROCEDURE — 86612 BLASTOMYCES ANTIBODY: CPT

## 2018-09-27 PROCEDURE — 86580 TB INTRADERMAL TEST: CPT

## 2018-09-27 PROCEDURE — 81025 URINE PREGNANCY TEST: CPT

## 2018-09-27 NOTE — RADIOLOGY IMAGING REPORT
[place] [facility] [place] 

 

PATIENT NAME: Lissette Lindsey

: 1995

MR: [id number]

V: [id number]

EXAM DATE: 

ORDERING PHYSICIAN: JOHN STEINBERG

TECHNOLOGIST: 

 

[place] [facility] [place]

Patient: Lissette Lidnsey

: 1995

MRN: [id number]

Visit/Account:[id number]

Date of Sevice:  2018

 

ACCESSION #: [id number]

 

Exam type: RETROGRADE PYELOGRAM

 

History: KIKDNEY STONES

 

Comparison: CT abdomen pelvis May 14, 2018

 

 

 

Findings:

 

10 portable intraoperative spot views over the abdomen pelvis were submitted.  The total fluoroscopy 
time was 0.1 minute.  The fluoroscopy dose was 7.26 mGray.  Contrast is seen in the nondilated ureter
s and renal collecting systems bilaterally.  No intraluminal filling defects are identified.  Small a
mount contrast on the bladder.  Cystoscope projects over the lower pelvis

 

IMPRESSION:

 

1.  As above

 

Report Dictated By: Corinne Madrigal MD at 2018 9:13 AM

 

Report E-Signed By: Corinne Madrigal MD  at 2018 9:24 AM

 

WSN:[facility]

## 2018-09-27 NOTE — OPERATIVE REPORT 1
EVENT DATE: September 27, 2018

SURGEON: Pop Silver MD

ANESTHESIOLOGIST: Denzel Angel MD

ANESTHESIA: General.





PREOPERATIVE DIAGNOSIS  

1.  Hematuria, etiology ?.

2.  Urgency and frequency of urination, etiology ?

3.  Bladder pain, right sided pain, etiology ?



POSTOPERATIVE DIAGNOSIS 

1.  Hematuria, etiology ?.

2.  Urgency and frequency of urination, etiology ?

3.  Bladder pain, right sided pain, etiology ?

4.  Urethritis trigonitis. 

5.  Probable irritable bladder syndrome such as interstitial cystitis.

6.  Urethral polyps.



PROCEDURE PERFORMED 

1.  Cystoscopy.

2.  Bilateral ureteral pyelogram.

3.  Dilation of bladder.

4.  Ureteral dilation.

5.  Fulguration Urethral polyps.





DESCRIPTION OF PROCEDURE 

Under general anesthetic, the patient was prepped and draped in the extended 

lithotomy position. The urethra calibrated to approximately 20 to 22 Wallisian.  

There was a pop at the meatus. 



The 21-Panendoscope was admitted through the urethra and into the bladder.  



The bladder showed 2 to 3+ trabeculation.  Trigonal ureteral orifices were 

normal. 



No bloody efflux from either orifice. 



She had inflammatory urethritis and pseudomembranous trigonitis. 



The bladder filled under gravity flow and was measured to a total of 950 cc's.



She had leakage around the Panendoscope at that point. 



Bilateral ureteral pyelograms were obtained and appeared to be grossly within 

normal limits in my opinion. 



Radiologist interpretation is pending. 



She had free drainage and good efflux of urine bilaterally. 



The polyps of the bladder neck area were fulgurated in their entirety. They were

circumferential. 



The bladder was refilled under gravity flow and was measured to a total of 1150 

cc's. 



Again, on drainage of the bladder there was bloody drainage as there was on 

initial dilation. 



Again, she had diffuse glomerulation but more than she had on the initial 

examination under gravity flow. 



The urethra was dilated to 34 Wallisian. 



There was a small amount of bleeding per urethra. 



Hydrocortisone cream was placed per urethra.



There was no purulent material or mass palpable of the urethra. 



Tidwell was left indwelling to be removed in the recovery room. 



The patient tolerated the procedure satisfactorily and returned to the recovery 

room in satisfactory condition. 



This is a 23-year-old white female complaining of persistent microscopic 

hematuria. 



This was confirmed on catheterized urine by hospital analysis. 



Urine cultures showed no growth. 



Patient persisted to complain of right sided pain and chronic urgency and 

frequency of urination. 



Options were discussed with the patient pretreatment. 



She was given an article on interstitial cystitis to review. 



Antispasmodics were tried pretreatment but gave minimal relief, if any. 



Patient will be ready for discharge home when alert and functional.  Force 

fluids, 2-liters per day.



Activities are as tolerated with planned followup October 8, 2018.  She is to 

call for an appointment.  She is to continue her usual medications.  Copy of 

instructions were given to the patient. 



Fungal serology and intermediate tubercular skin tests are pending. 



Patient is discharged on Norco, Pepcid, Pyridium, Motrin and Cipro therapy.

MTDD

## 2019-01-12 ENCOUNTER — HOSPITAL ENCOUNTER (EMERGENCY)
Dept: HOSPITAL 89 - ER | Age: 24
Discharge: HOME | End: 2019-01-12
Payer: COMMERCIAL

## 2019-01-12 VITALS — SYSTOLIC BLOOD PRESSURE: 109 MMHG | DIASTOLIC BLOOD PRESSURE: 70 MMHG

## 2019-01-12 VITALS — BODY MASS INDEX: 36.94 KG/M2 | WEIGHT: 200 LBS

## 2019-01-12 DIAGNOSIS — K52.9: Primary | ICD-10-CM

## 2019-01-12 LAB — PLATELET COUNT, AUTOMATED: 314 K/UL (ref 150–450)

## 2019-01-12 PROCEDURE — 83690 ASSAY OF LIPASE: CPT

## 2019-01-12 PROCEDURE — 84075 ASSAY ALKALINE PHOSPHATASE: CPT

## 2019-01-12 PROCEDURE — 84132 ASSAY OF SERUM POTASSIUM: CPT

## 2019-01-12 PROCEDURE — 82040 ASSAY OF SERUM ALBUMIN: CPT

## 2019-01-12 PROCEDURE — 82374 ASSAY BLOOD CARBON DIOXIDE: CPT

## 2019-01-12 PROCEDURE — 84450 TRANSFERASE (AST) (SGOT): CPT

## 2019-01-12 PROCEDURE — 99284 EMERGENCY DEPT VISIT MOD MDM: CPT

## 2019-01-12 PROCEDURE — 82947 ASSAY GLUCOSE BLOOD QUANT: CPT

## 2019-01-12 PROCEDURE — 74177 CT ABD & PELVIS W/CONTRAST: CPT

## 2019-01-12 PROCEDURE — 96374 THER/PROPH/DIAG INJ IV PUSH: CPT

## 2019-01-12 PROCEDURE — 87502 INFLUENZA DNA AMP PROBE: CPT

## 2019-01-12 PROCEDURE — 81025 URINE PREGNANCY TEST: CPT

## 2019-01-12 PROCEDURE — 82247 BILIRUBIN TOTAL: CPT

## 2019-01-12 PROCEDURE — 84295 ASSAY OF SERUM SODIUM: CPT

## 2019-01-12 PROCEDURE — 82435 ASSAY OF BLOOD CHLORIDE: CPT

## 2019-01-12 PROCEDURE — 83605 ASSAY OF LACTIC ACID: CPT

## 2019-01-12 PROCEDURE — 96361 HYDRATE IV INFUSION ADD-ON: CPT

## 2019-01-12 PROCEDURE — 84460 ALANINE AMINO (ALT) (SGPT): CPT

## 2019-01-12 PROCEDURE — 96375 TX/PRO/DX INJ NEW DRUG ADDON: CPT

## 2019-01-12 PROCEDURE — 84520 ASSAY OF UREA NITROGEN: CPT

## 2019-01-12 PROCEDURE — 81001 URINALYSIS AUTO W/SCOPE: CPT

## 2019-01-12 PROCEDURE — 82310 ASSAY OF CALCIUM: CPT

## 2019-01-12 PROCEDURE — 82565 ASSAY OF CREATININE: CPT

## 2019-01-12 PROCEDURE — 84155 ASSAY OF PROTEIN SERUM: CPT

## 2019-01-12 PROCEDURE — 85025 COMPLETE CBC W/AUTO DIFF WBC: CPT

## 2019-01-12 NOTE — RADIOLOGY IMAGING REPORT
FACILITY: [facility] [place] 

 

PATIENT NAME: Lissette Lindsey

: 1995

MR: [id number]

V: [id number]

EXAM DATE: 

ORDERING PHYSICIAN: ROSEANNE AMADO

TECHNOLOGIST: 

 

Location: [facility]

Patient: Lissette Lindsey

: 1995

MRN: [id number]

Visit/Account:[id number]

Date of Sevice:  2019

 

ACCESSION #: [id number]

 

EXAMINATION: CT abdomen and pelvis with IV contrast

 

HISTORY:  Abdominal pain.  Flank pain.

 

TECHNIQUE:   Axial CT images of the abdomen and pelvis were obtained with IV contrast, with coronal a
nd sagittal 2D reconstructed images.

One of the following dose optimization techniques was utilized in the performance of this exam: Autom
ated exposure control; adjustment of the mA and/or kV according to the patient's size; or use of an i
terative  reconstruction technique.  Specific details can be referenced in the facility's radiology C
T exam operational policy.

 

Contrast:  75 mL of IV Isovue-370.

 

COMPARISON:  CT abdomen/pelvis without contrast 2018.

 

FINDINGS:

Liver:  Negative.

Gallbladder and bile ducts:  Cholecystectomy.  No bile duct dilatation.

Spleen:  Negative.

Pancreas:  Negative.

Adrenal glands:  Negative.

Kidneys:  Negative. No hydronephrosis or urinary calculi.

 

Bowel and peritoneum:  The small bowel and colon are normal in caliber.  No bowel obstruction.  There
 is some increased fluid present within nondilated small bowel loops with scattered air-fluid levels.
  This is nonspecific but may be compatible with a generalized enteritis.  No localized bowel wall th
ickening.  Normal appendix.  No free fluid or free intraperitoneal air.

 

Pelvic  structures:  Unremarkable by CT.  No large adnexal cyst in the pelvis.

Lymph node assessment:  Negative.

Vessels:  Negative.

 

Musculoskeletal:   Negative.

Body wall:  Negative.

Lung bases:  Negative.

 

IMPRESSION:

1.  There is some increased fluid present within nondilated small bowel loops with a few scattered ai
r-fluid levels.  Appearance is nonspecific but may be compatible with a generalized enteritis.

2.  No other acute intra-abdominal findings.

3.  Cholecystectomy.

4.  Normal appendix.

 

 

Report Dictated By: Adrian Wiseman MD at 2019 7:42 PM

 

Report E-Signed By: Adrian Wiseman MD  at 2019 7:51 PM

 

WSN:[facility]

## 2019-01-12 NOTE — ER REPORT
History and Physical


Time Seen By MD:  18:29


HPI/ROS


CHIEF COMPLAINT: Abdominal pain and flank pain





HISTORY OF PRESENT ILLNESS: This is a 23-year-old female who presents to the 


emergency department for abdominal and flank pain. Patient states that about 1 


AM she developed a sudden onset of aches and chills, with an acute onset of 


nausea and vomiting. She's had aches and chills since, she's had multiple 


episodes of vomiting. She states that she just started having diarrhea as well. 


She does have flank pain bilaterally. She states she does have a history of 


pyelonephritis as well as kidney stones. She denies chest pain or shortness of 


breath. No headaches or rashes. No dysuria or hematuria.





REVIEW OF SYSTEMS:


Constitutional: As above.


Eyes: No discharge.


ENT: No sore throat. 


Cardiovascular: No chest pain, no palpitations.


Respiratory: No cough, no shortness of breath.


Gastrointestinal: As above.


Genitourinary: No hematuria.


Musculoskeletal: As above.


Skin: No rashes.


Neurological: No headache.


Allergies:  


Coded Allergies:  


     No Known Allergies (Verified  Allergy, Mild, 5/14/18)


Home Meds


Active Scripts


Promethazine Hcl (PROMETHAZINE HCL) 25 Mg Tablet, 25 MG PO Q8H PRN for prn, #12 


TAB 0 Refills


   Prov:ROSEANNE AMADO [facility]         1/12/19


Ondansetron Hcl (ZOFRAN) 4 Mg Tablet, 4 MG PO Q4-6H PRN for prn, #20 TAB


   Prov:ROSEANNE AMADO [facility]         1/12/19


Promethazine Hcl (PROMETHAZINE HCL) 25 Mg Tablet, 25 MG PO Q8H PRN for 


NAUSEA/VOMITING, #20 TAB 0 Refills


   Prov:ELISE YUN MD         5/15/18


Cyclobenzaprine Hcl (CYCLOBENZAPRINE HCL) 10 Mg Tablet, 10 MG PO Q8H PRN for 


MUSCLE SPASMS, #20 TAB 0 Refills


   Prov:ELISE YUN MD         5/15/18


Reported Medications


Ciprofloxacin 500 Mg Tab (CIPROFLOXACIN 500 MG TAB) 500 Mg Tablet, 500 MG PO 


Q12H, #30 TAB


   9/27/18


Ibuprofen (IBUPROFEN) 800 Mg Tablet, 1 TAB PO Q8H PRN for PAIN, #50 TAB


   9/27/18


Phenazopyridine Hcl (PHENAZOPYRIDINE HCL) 200 Mg Tablet, 200 MG PO TID, #30 TAB


   9/27/18


Famotidine (FAMOTIDINE) 20 Mg Tablet, 20 MG PO BID for DECREASE ACID IN STOMACH,


#20 TAB


   9/27/18


Hydrocodone Bit/Acetaminophen (NORCO 5-325 TABLET) 1 Each Tablet, 1 EACH PO Q4-


6H PRN for PAIN, #20 TAB


   9/27/18


Albuterol Sulfate (PROVENTIL HFA) 6.7 Gm Inh, 1-2 PUFF INH 3-4XD PRN for 


SHORTNESS OF BREATH, INH


   9/21/18


Butalb/Acetaminophen/Caffeine (Phrenilin Forte -40 mg) 50 Mg-300 Mg-40 Mg 


Capsule, 1 CAP PO Q6H PRN for HEADACHE


   9/21/18


Past Medical/Surgical History


The patient has a past medical and surgical history of wearing glasses, 


migraines, asthma, sleep apnea, gallbladder disease, cholecystectomy, GERD, on 


Depo-Provera, multiple kidney stones, stents and lithotripsy, anxiety, 


depression, tobacco, eczema.


Hx Smoking:  Yes ( CHEWED OCC- FOR 1 YEARS)


Smoking Status:  Never Smoker


Exposure to Second Hand Smoke?:  Yes


Hx Substance Use Disorder:  No


Hx Alcohol Use:  No


Constitutional





Vital Sign - Last 24 Hours








 1/12/19 1/12/19 1/12/19 1/12/19





 18:23 18:27 18:50 19:00


 


Temp 98.7   


 


Pulse 122  91 


 


Resp 22   


 


B/P (MAP) 107/64 107/64 (78)  110/74 (86)


 


Pulse Ox 95  96 


 


    





 1/12/19 1/12/19 1/12/19 





 19:05 19:35 19:40 


 


Pulse 98 86  


 


B/P (MAP)   109/70 (83) 


 


Pulse Ox 94 95  








Physical Exam


   General Appearance: The patient is alert, has no immediate need for airway 


protection and no signs of toxicity, tearful and anxious.


Eyes: Pupils equal and round no pallor or injection.


ENT, Mouth: Mucous membranes are moist.


Respiratory: There are no retractions, lungs are clear to auscultation.


Cardiovascular: Regular rate and rhythm.


Gastrointestinal:  Abdomen is round, soft and mildly tender throughout all 


quadrants. no masses, bowel sounds normal. No abdominal bruits.


Neurological: Alert and oriented 4. Moving all extremity is. Following all 


commands. No focal neuro deficits.


Skin: Warm and dry, no rashes.


Musculoskeletal:  Neck is supple non tender. Bilateral CVA tenderness.


      Extremities are nontender, nonswollen and have full range of motion.





DIFFERENTIAL DIAGNOSIS: After history and physical exam differential diagnosis 


was considered for abdominal pain in a female including but not limited to 


ovarian cyst, pelvic inflammatory disease, ovarian torsion, urinary tract 


infection, and appendicitis.





Medical Decision Making


Data Points


Result Diagram:  


1/12/19 1842 1/12/19 1842





Laboratory





Hematology








Test


 1/12/19


18:42 1/12/19


18:50


 


Red Blood Count


 5.56 M/uL


(4.17-5.56) 





 


Mean Corpuscular Volume


 87.6 fL


(80.0-96.0) 





 


Mean Corpuscular Hemoglobin


 30.7 pg


(26.0-33.0) 





 


Mean Corpuscular Hemoglobin


Concent 35.0 g/dL


(32.0-36.0) 





 


Red Cell Distribution Width


 12.5 %


(11.5-14.5) 





 


Mean Platelet Volume


 9.9 fL


(7.2-11.1) 





 


Neutrophils (%) (Auto)


 91.0 %


(39.4-72.5) 





 


Lymphocytes (%) (Auto)


 4.7 %


(17.6-49.6) 





 


Monocytes (%) (Auto)


 3.9 %


(4.1-12.4) 





 


Eosinophils (%) (Auto)


 0.0 %


(0.4-6.7) 





 


Basophils (%) (Auto)


 0.4 %


(0.3-1.4) 





 


Nucleated RBC Relative Count


(auto) 0.0 /100WBC 


 





 


Neutrophils # (Auto)


 12.3 K/uL


(2.0-7.4) 





 


Lymphocytes # (Auto)


 0.6 K/uL


(1.3-3.6) 





 


Monocytes # (Auto)


 0.5 K/uL


(0.3-1.0) 





 


Eosinophils # (Auto)


 0.0 K/uL


(0.0-0.5) 





 


Basophils # (Auto)


 0.1 K/uL


(0.0-0.1) 





 


Nucleated RBC Absolute Count


(auto) 0.01 K/uL 


 





 


Urine Color Yellow  


 


Urine Clarity


 Slightly-cloudy


 





 


Urine pH


 5.0 pH


(4.8-9.5) 





 


Urine Specific Gravity 1.016  


 


Urine Protein


 Negative mg/dL


(NEGATIVE) 





 


Urine Glucose (UA)


 Negative mg/dL


(NEGATIVE) 





 


Urine Ketones


 20 mg/dL


(NEGATIVE) 





 


Urine Blood


 Moderate


(NEGATIVE) 





 


Urine Nitrite


 Negative


(NEGATIVE) 





 


Urine Bilirubin


 Negative


(NEGATIVE) 





 


Urine Urobilinogen


 Negative mg/dL


(0.2-1.9) 





 


Urine Leukocyte Esterase


 Negative


(NEGATIVE) 





 


Urine RBC


 2 /HPF


(0-2/HPF) 





 


Urine WBC


 3 /HPF


(0-5/HPF) 





 


Urine Squamous Epithelial


Cells Many /LPF


(</=FEW) 





 


Urine Bacteria


 Negative /HPF


(NONE-FEW) 





 


Urine Mucus


 Few /HPF


(NONE-FEW) 





 


Urine HCG, Qualitative


 Negative


(NEGATIVE) 





 


Sodium Level


 138 mmol/L


(137-145) 





 


Potassium Level


 3.2 mmol/L


(3.5-5.0) 





 


Chloride Level


 102 mmol/L


() 





 


Carbon Dioxide Level


 20 mmol/L


(22-31) 





 


Blood Urea Nitrogen


 11 mg/dl


(7-18) 





 


Creatinine


 0.60 mg/dl


(0.52-1.04) 





 


Glomerular Filtration Rate


Calc > 60.0 


 





 


Random Glucose


 92 mg/dl


() 





 


Lactate


 1.8 mmol/L


(0.7-2.1) 





 


Calcium Level


 9.8 mg/dl


(8.4-10.2) 





 


Total Bilirubin


 0.8 mg/dl


(0.2-1.3) 





 


Aspartate Amino Transf


(AST/SGOT) 27 U/L (0-35) 


 





 


Alanine Aminotransferase


(ALT/SGPT) 37 U/L (0-56) 


 





 


Alkaline Phosphatase


 108 U/L


(0-126) 





 


Total Protein


 8.0 g/dl


(6.3-8.2) 





 


Albumin


 4.6 g/dl


(3.5-5.0) 





 


Lipase


 69 U/L


() 





 


Influenza Virus Type A (PCR)


 


 Negative


(NEGATIVE)


 


Influenza Virus Type B (PCR)


 


 Negative


(NEGATIVE)








Chemistry








Test


 1/12/19


18:42 1/12/19


18:50


 


White Blood Count


 13.6 k/uL


(4.5-11.0) 





 


Red Blood Count


 5.56 M/uL


(4.17-5.56) 





 


Hemoglobin


 17.0 g/dL


(12.0-16.0) 





 


Hematocrit


 48.7 %


(34.0-47.0) 





 


Mean Corpuscular Volume


 87.6 fL


(80.0-96.0) 





 


Mean Corpuscular Hemoglobin


 30.7 pg


(26.0-33.0) 





 


Mean Corpuscular Hemoglobin


Concent 35.0 g/dL


(32.0-36.0) 





 


Red Cell Distribution Width


 12.5 %


(11.5-14.5) 





 


Platelet Count


 314 K/uL


(150-450) 





 


Mean Platelet Volume


 9.9 fL


(7.2-11.1) 





 


Neutrophils (%) (Auto)


 91.0 %


(39.4-72.5) 





 


Lymphocytes (%) (Auto)


 4.7 %


(17.6-49.6) 





 


Monocytes (%) (Auto)


 3.9 %


(4.1-12.4) 





 


Eosinophils (%) (Auto)


 0.0 %


(0.4-6.7) 





 


Basophils (%) (Auto)


 0.4 %


(0.3-1.4) 





 


Nucleated RBC Relative Count


(auto) 0.0 /100WBC 


 





 


Neutrophils # (Auto)


 12.3 K/uL


(2.0-7.4) 





 


Lymphocytes # (Auto)


 0.6 K/uL


(1.3-3.6) 





 


Monocytes # (Auto)


 0.5 K/uL


(0.3-1.0) 





 


Eosinophils # (Auto)


 0.0 K/uL


(0.0-0.5) 





 


Basophils # (Auto)


 0.1 K/uL


(0.0-0.1) 





 


Nucleated RBC Absolute Count


(auto) 0.01 K/uL 


 





 


Urine Color Yellow  


 


Urine Clarity


 Slightly-cloudy


 





 


Urine pH


 5.0 pH


(4.8-9.5) 





 


Urine Specific Gravity 1.016  


 


Urine Protein


 Negative mg/dL


(NEGATIVE) 





 


Urine Glucose (UA)


 Negative mg/dL


(NEGATIVE) 





 


Urine Ketones


 20 mg/dL


(NEGATIVE) 





 


Urine Blood


 Moderate


(NEGATIVE) 





 


Urine Nitrite


 Negative


(NEGATIVE) 





 


Urine Bilirubin


 Negative


(NEGATIVE) 





 


Urine Urobilinogen


 Negative mg/dL


(0.2-1.9) 





 


Urine Leukocyte Esterase


 Negative


(NEGATIVE) 





 


Urine RBC


 2 /HPF


(0-2/HPF) 





 


Urine WBC


 3 /HPF


(0-5/HPF) 





 


Urine Squamous Epithelial


Cells Many /LPF


(</=FEW) 





 


Urine Bacteria


 Negative /HPF


(NONE-FEW) 





 


Urine Mucus


 Few /HPF


(NONE-FEW) 





 


Urine HCG, Qualitative


 Negative


(NEGATIVE) 





 


Glomerular Filtration Rate


Calc > 60.0 


 





 


Lactate


 1.8 mmol/L


(0.7-2.1) 





 


Calcium Level


 9.8 mg/dl


(8.4-10.2) 





 


Total Bilirubin


 0.8 mg/dl


(0.2-1.3) 





 


Aspartate Amino Transf


(AST/SGOT) 27 U/L (0-35) 


 





 


Alanine Aminotransferase


(ALT/SGPT) 37 U/L (0-56) 


 





 


Alkaline Phosphatase


 108 U/L


(0-126) 





 


Total Protein


 8.0 g/dl


(6.3-8.2) 





 


Albumin


 4.6 g/dl


(3.5-5.0) 





 


Lipase


 69 U/L


() 





 


Influenza Virus Type A (PCR)


 


 Negative


(NEGATIVE)


 


Influenza Virus Type B (PCR)


 


 Negative


(NEGATIVE)








Urinalysis








Test


 1/12/19


18:42


 


Urine Color Yellow 


 


Urine Clarity


 Slightly-cloudy





 


Urine pH


 5.0 pH


(4.8-9.5)


 


Urine Specific Gravity 1.016 


 


Urine Protein


 Negative mg/dL


(NEGATIVE)


 


Urine Glucose (UA)


 Negative mg/dL


(NEGATIVE)


 


Urine Ketones


 20 mg/dL


(NEGATIVE)


 


Urine Blood


 Moderate


(NEGATIVE)


 


Urine Nitrite


 Negative


(NEGATIVE)


 


Urine Bilirubin


 Negative


(NEGATIVE)


 


Urine Urobilinogen


 Negative mg/dL


(0.2-1.9)


 


Urine Leukocyte Esterase


 Negative


(NEGATIVE)


 


Urine RBC


 2 /HPF


(0-2/HPF)


 


Urine WBC


 3 /HPF


(0-5/HPF)


 


Urine Squamous Epithelial


Cells Many /LPF


(</=FEW)


 


Urine Bacteria


 Negative /HPF


(NONE-FEW)


 


Urine Mucus


 Few /HPF


(NONE-FEW)


 


Urine HCG, Qualitative


 Negative


(NEGATIVE)











EKG/Imaging


Imaging


ACCESSION #: [id number]


 


EXAMINATION: CT abdomen and pelvis with IV contrast


 


HISTORY:  Abdominal pain.  Flank pain.


 


TECHNIQUE:   Axial CT images of the abdomen and pelvis were obtained with IV 


contrast, with coronal and sagittal 2D reconstructed images.


One of the following dose optimization techniques was utilized in the 


performance of this exam: Automated exposure control; adjustment of the mA 


and/or kV according to the patient's size; or use of an iterative  


reconstruction technique.  Specific details can be referenced in the facility's 


radiology CT exam operational policy.


 


Contrast:  75 mL of IV Isovue-370.


 


COMPARISON:  CT abdomen/pelvis without contrast 9/12/2018.


 


FINDINGS:


Liver:  Negative.


Gallbladder and bile ducts:  Cholecystectomy.  No bile duct dilatation.


Spleen:  Negative.


Pancreas:  Negative.


Adrenal glands:  Negative.


Kidneys:  Negative. No hydronephrosis or urinary calculi.


 


Bowel and peritoneum:  The small bowel and colon are normal in caliber.  No 


bowel obstruction.  There is some increased fluid present within nondilated 


small bowel loops with scattered air-fluid levels.  This is nonspecific but may 


be compatible with a generalized enteritis.  No localized bowel wall thickening.


 Normal appendix.  No free fluid or free intraperitoneal air.


 


Pelvic  structures:  Unremarkable by CT.  No large adnexal cyst in the pelvis.


Lymph node assessment:  Negative.


Vessels:  Negative.


 


Musculoskeletal:   Negative.


Body wall:  Negative.


Lung bases:  Negative.


 


IMPRESSION:


1.  There is some increased fluid present within nondilated small bowel loops 


with a few scattered air-fluid levels.  Appearance is nonspecific but may be 


compatible with a generalized enteritis.


2.  No other acute intra-abdominal findings.


3.  Cholecystectomy.


4.  Normal appendix.


 


 


Report Dictated By: Adrian Wiseman MD at 1/12/2019 7:42 PM


 


Report E-Signed By: Adrian Wiseman MD  at 1/12/2019 7:51 PM


 


WSN:[facility]





ED Course/Re-evaluation


Clinical Indication for ER IV:  Hydration, IV Access


ED Course


The patient was admitted to room. A history and physical were obtained. 


Differential diagnoses were considered. An IV was started. A CBC, CMP, lactate 


and UA were collected. A 1 L normal saline bolus was given. 4 mg IV Zofran, 4 mg


IV morphine.CBC showing white count of 13.6 with a left shift, normal lactate, 


potassium 3.2 otherwise unremarkable, ketonuria, likely contaminated urine, 


negative hCG. A CT of the abdomen and pelvis showing some increased fluid 


present within nondilated small bowel loops with a few scattered air-fluid 


levels.  Appearance is nonspecific but may be compatible with a generalized 


enteritis. I did review the results with the patient, I did tell her that her 


symptoms and the blood work are consistent with a gastroenteritis, this is a 


viral type of illness and will likely pass. The patient was also given 12.5 mg 


IV Phenergan. And thousand grams by mouth acetaminophen. Patient states feeling 


mildly better upon discharge. I did recommend following up with her primary care


provider within the next several days for reevaluation. The patient was also 


sent home with a take home pack for Phenergan and Zofran. She was also sent home


with a prescription for Phenergan and Zofran. The patient had no other questions


or concerns, was agreeable with this plan of care and discharged home.


Decision to Disposition Date:  Jan 12, 2019


Decision to Disposition Time:  20:00





Depart


Departure


Latest Vital Signs





Vital Signs








  Date Time  Temp Pulse Resp B/P (MAP) Pulse Ox O2 Delivery O2 Flow Rate FiO2


 


1/12/19 19:40    109/70 (83)    


 


1/12/19 19:35  86   95   


 


1/12/19 18:23 98.7  22     








Core Temperature (Celsius):  39.56


Impression:  


   Primary Impression:  


   Gastroenteritis


Condition:  Improved


Disposition:  HOME OR SELF-CARE


Referrals:  


CAMILLE BOTELLO MD (PCP)


5 Days


New Scripts


Promethazine Hcl (PROMETHAZINE HCL) 25 Mg Tablet


25 MG PO Q8H PRN for prn, #12 TAB 0 Refills


   Prov: ROSEANNE AMADO-[facility]         1/12/19 


Ondansetron Hcl (ZOFRAN) 4 Mg Tablet


4 MG PO Q4-6H PRN for prn, #20 TAB


   Prov: ROSEANNE AMADO [facility]         1/12/19


Patient Instructions:  Clear Liquid Diet (ED), Gastroenteritis (DC)





Additional Instructions:  


No concerning findings on you CT today.


Your symptoms, are consistent with viral gastroenteritis.


Clear liquid diet for the next 24-48 hours. 


Drink plenty of fluids.


Get plenty of rest.


Return to the ED the Ed for any other concerns or worsening symptoms. 


Follow up with your PCP within one week for reevaluation.











ROSEANNE AMADO-[facility]      Jan 12, 2019 18:30

## 2019-08-07 ENCOUNTER — HOSPITAL ENCOUNTER (OUTPATIENT)
Dept: HOSPITAL 89 - AUD | Age: 24
End: 2019-08-07
Attending: OTOLARYNGOLOGY
Payer: COMMERCIAL

## 2019-08-07 VITALS — BODY MASS INDEX: 36.94 KG/M2

## 2019-08-07 DIAGNOSIS — H91.91: Primary | ICD-10-CM

## 2019-08-07 DIAGNOSIS — H93.11: ICD-10-CM

## 2019-08-07 PROCEDURE — 92557 COMPREHENSIVE HEARING TEST: CPT

## 2019-08-07 PROCEDURE — 92570 ACOUSTIC IMMITANCE TESTING: CPT
